# Patient Record
Sex: FEMALE | Race: WHITE | NOT HISPANIC OR LATINO | Employment: OTHER | URBAN - METROPOLITAN AREA
[De-identification: names, ages, dates, MRNs, and addresses within clinical notes are randomized per-mention and may not be internally consistent; named-entity substitution may affect disease eponyms.]

---

## 2023-05-10 ENCOUNTER — TELEPHONE (OUTPATIENT)
Dept: FAMILY MEDICINE | Facility: CLINIC | Age: 23
End: 2023-05-10

## 2023-05-10 NOTE — TELEPHONE ENCOUNTER
LMOR informing of appt for 05/11/2023. Come 15 min prior to fill out paperwork and bring medications if any.

## 2023-06-14 ENCOUNTER — TELEPHONE (OUTPATIENT)
Dept: FAMILY MEDICINE | Facility: CLINIC | Age: 23
End: 2023-06-14

## 2023-06-14 DIAGNOSIS — Z34.90 PREGNANCY, UNSPECIFIED GESTATIONAL AGE: Primary | ICD-10-CM

## 2023-06-14 NOTE — TELEPHONE ENCOUNTER
----- Message from Vee Nixon sent at 6/14/2023  2:09 PM CDT -----  Pt is having some cramping and is concerned about her pregnancy. Can you send a referral to an OBGYN? Please advise. Thank you  ----- Message -----  From: Kristin Perla  Sent: 6/14/2023  11:09 AM CDT  To: Vee Nixon    Patient called and stated that she has a new patient appointment coming up at the end of the month and she would like to see if she can move it up? She just found out she is pregnant yesterday and she need to get in to see an ob/gyn and she need a referral. Please give her a call at [phone]

## 2023-06-15 ENCOUNTER — TELEPHONE (OUTPATIENT)
Dept: FAMILY MEDICINE | Facility: CLINIC | Age: 23
End: 2023-06-15

## 2023-06-15 NOTE — TELEPHONE ENCOUNTER
----- Message from Linh Bah MA sent at 6/15/2023 10:44 AM CDT -----  Regarding: new patient needs referal  Patient called yesterday to check status on GYN referal for severe cramping. 2nd request. She was rescheduled by our office 5/11/23 but dr. Sosa was out that day and patient was rescheduled to 6/28/2023..  [phone]

## 2023-06-19 LAB — RUBELLA IMMUNE STATUS: NORMAL

## 2023-06-20 ENCOUNTER — TELEPHONE (OUTPATIENT)
Dept: FAMILY MEDICINE | Facility: CLINIC | Age: 23
End: 2023-06-20

## 2023-06-20 NOTE — TELEPHONE ENCOUNTER
----- Message from Kristin Perla sent at 6/20/2023  3:35 PM CDT -----  Andrew asif called and stated that she is faxing over a order form for diabetic supplies for the patient and she need to get the patient last office visit information. Please give her a call at [phone]

## 2023-06-30 ENCOUNTER — TELEPHONE (OUTPATIENT)
Dept: FAMILY MEDICINE | Facility: CLINIC | Age: 23
End: 2023-06-30

## 2023-06-30 DIAGNOSIS — E10.69 TYPE 1 DIABETES MELLITUS WITH OTHER SPECIFIED COMPLICATION: Primary | ICD-10-CM

## 2023-06-30 DIAGNOSIS — Z34.90 PREGNANCY, UNSPECIFIED GESTATIONAL AGE: ICD-10-CM

## 2023-06-30 NOTE — TELEPHONE ENCOUNTER
----- Message from Kristin Perla sent at 6/30/2023 11:49 AM CDT -----  FYI : patient called and was checking on to see if her referral was set up her an endocrinologist advised that the nurse Akiko  stated that she will check with the doctor when she is back in the office next week. No patient call back needed for this patient.

## 2023-06-30 NOTE — TELEPHONE ENCOUNTER
Spoke with patient to cancel NP appointment due to pregnancy.  Patient states she still needs visit as she is type 1 diabetic and requires an endocrine referral per gyn.  States gyn cannot give referral as she is .  Referral pended -ANABELA

## 2023-07-07 LAB
HIV 1+2 AB+HIV1 P24 AG SERPL QL IA: NEGATIVE
RPR: NON REACTIVE

## 2023-08-15 ENCOUNTER — TELEPHONE (OUTPATIENT)
Dept: FAMILY MEDICINE | Facility: CLINIC | Age: 23
End: 2023-08-15

## 2023-08-15 NOTE — TELEPHONE ENCOUNTER
Spoke to pt confirming NP appointment. Let pt know they will need to bring in all of medication bottles. Advised that if pt no shows unable to reschuele appt

## 2023-08-16 ENCOUNTER — OFFICE VISIT (OUTPATIENT)
Dept: FAMILY MEDICINE | Facility: CLINIC | Age: 23
End: 2023-08-16
Payer: OTHER GOVERNMENT

## 2023-08-16 VITALS
HEART RATE: 88 BPM | SYSTOLIC BLOOD PRESSURE: 118 MMHG | HEIGHT: 63 IN | DIASTOLIC BLOOD PRESSURE: 72 MMHG | WEIGHT: 145 LBS | BODY MASS INDEX: 25.69 KG/M2

## 2023-08-16 DIAGNOSIS — E10.9 TYPE 1 DIABETES MELLITUS WITHOUT COMPLICATION: ICD-10-CM

## 2023-08-16 DIAGNOSIS — Z33.1 IUP (INTRAUTERINE PREGNANCY), INCIDENTAL: ICD-10-CM

## 2023-08-16 DIAGNOSIS — Z76.89 ESTABLISHING CARE WITH NEW DOCTOR, ENCOUNTER FOR: Primary | ICD-10-CM

## 2023-08-16 PROBLEM — M67.00 ACQUIRED SHORT ACHILLES TENDON: Status: ACTIVE | Noted: 2023-08-16

## 2023-08-16 PROBLEM — E11.9 DIABETES MELLITUS: Status: ACTIVE | Noted: 2023-08-16

## 2023-08-16 PROBLEM — H52.00 HYPEROPIA: Status: ACTIVE | Noted: 2023-08-16

## 2023-08-16 PROBLEM — H71.90 CHOLESTEATOMA: Status: ACTIVE | Noted: 2023-08-16

## 2023-08-16 PROCEDURE — 99385 PREV VISIT NEW AGE 18-39: CPT | Mod: S$GLB,,,

## 2023-08-16 PROCEDURE — 99385 PR PREVENTIVE VISIT,NEW,18-39: ICD-10-PCS | Mod: S$GLB,,,

## 2023-08-16 RX ORDER — INSULIN LISPRO 100 [IU]/ML
INJECTION, SOLUTION INTRAVENOUS; SUBCUTANEOUS
COMMUNITY
End: 2023-09-07 | Stop reason: SDUPTHER

## 2023-08-16 NOTE — PROGRESS NOTES
SUBJECTIVE:    Patient ID: Yana Richards is a 23 y.o. female.    Chief Complaint: Establish Care (New patient to est. Care//no med bottles//patient is currently 14 weeks in gestation//diabetic type 1 since age 14//tc)    Yana presents to clinic today to establish care. She has  medical coverage, which requires her to have a pcp to obtain referrals to specialists. She has a history of IDDM since she was 14 years old. She wears an insulin pump. She will have an appt soon with endo and is hoping to get a pump that works with a CGM to automatically deliver insulin based on her needs. She feels well overall. She manages her diabetes well.   She is currently pregnant. Her EDC is  2/14/2024. She has been having routine prenatal care with Dr. Ann Moore.   She does need supplies for her insulin pump. She uses [employer]. The contact number is [phone].            No visits with results within 6 Month(s) from this visit.   Latest known visit with results is:   No results found for any previous visit.       Past Medical History:   Diagnosis Date    Diabetes mellitus type I      Social History     Socioeconomic History    Marital status: Single   Tobacco Use    Smoking status: Former     Current packs/day: 0.00     Types: Cigarettes    Smokeless tobacco: Never   Substance and Sexual Activity    Alcohol use: Not Currently     Alcohol/week: 1.0 standard drink of alcohol     Types: 1 Glasses of wine per week    Drug use: Never    Sexual activity: Yes     Past Surgical History:   Procedure Laterality Date    main calf muscle removal  08/21/2013    TYMPANOSTOMY TUBE PLACEMENT Bilateral 08/09/2006     Family History   Problem Relation Age of Onset    Miscarriages / Stillbirths Mother     No Known Problems Father        Review of patient's allergies indicates:   Allergen Reactions    Vancomycin analogues Anaphylaxis       Current Outpatient Medications:     insulin lispro 100 unit/mL injection, Inject  "into the skin 3 (three) times daily before meals. Insulin pump humalog, Disp: , Rfl:     Review of Systems   Constitutional:  Negative for activity change, appetite change, chills, fatigue, fever and unexpected weight change.   HENT:  Negative for nasal congestion, ear pain, postnasal drip, rhinorrhea, sore throat and trouble swallowing.    Eyes:  Negative for discharge and visual disturbance.   Respiratory:  Negative for apnea, cough, shortness of breath and wheezing.    Cardiovascular:  Negative for chest pain, palpitations and leg swelling.   Gastrointestinal:  Negative for abdominal pain, blood in stool, constipation, diarrhea, nausea, vomiting and reflux.   Genitourinary:  Negative for bladder incontinence, dysuria, frequency and urgency.   Musculoskeletal:  Negative for arthralgias, gait problem, leg pain and myalgias.   Integumentary:  Negative for rash and mole/lesion.   Neurological:  Negative for dizziness, tremors, weakness, light-headedness, numbness and headaches.   Hematological:  Does not bruise/bleed easily.   Psychiatric/Behavioral:  Negative for dysphoric mood, sleep disturbance and suicidal ideas. The patient is not nervous/anxious.            Objective:      Vitals:    08/16/23 1530   BP: 118/72   Pulse: 88   Weight: 65.8 kg (145 lb)   Height: 5' 2.5" (1.588 m)     Physical Exam  Vitals and nursing note reviewed.   Constitutional:       General: She is not in acute distress.     Appearance: Normal appearance. She is well-developed and normal weight. She is not ill-appearing.   HENT:      Head: Normocephalic and atraumatic.      Right Ear: External ear normal.      Left Ear: External ear normal.      Nose: Nose normal. No congestion or rhinorrhea.      Mouth/Throat:      Mouth: Mucous membranes are moist.      Pharynx: Oropharynx is clear. No oropharyngeal exudate or posterior oropharyngeal erythema.   Eyes:      General:         Right eye: No discharge.         Left eye: No discharge.      " Conjunctiva/sclera: Conjunctivae normal.      Pupils: Pupils are equal, round, and reactive to light.   Neck:      Thyroid: No thyromegaly.   Cardiovascular:      Rate and Rhythm: Normal rate and regular rhythm.      Pulses: Normal pulses.      Heart sounds: Normal heart sounds. No murmur heard.  Pulmonary:      Effort: Pulmonary effort is normal.      Breath sounds: Normal breath sounds. No wheezing or rales.   Abdominal:      General: Bowel sounds are normal. There is no distension.      Palpations: Abdomen is soft.      Tenderness: There is no abdominal tenderness.   Musculoskeletal:         General: No tenderness or deformity. Normal range of motion.      Cervical back: Normal range of motion and neck supple.      Lumbar back: Normal. No spasms.      Right lower leg: No edema.      Left lower leg: No edema.      Comments: Bends 90 degrees at  waist   Skin:     General: Skin is warm and dry.      Capillary Refill: Capillary refill takes less than 2 seconds.      Coloration: Skin is not jaundiced or pale.      Findings: No lesion or rash.   Neurological:      General: No focal deficit present.      Mental Status: She is alert and oriented to person, place, and time.      Cranial Nerves: No cranial nerve deficit.      Motor: No weakness.      Coordination: Coordination normal.      Gait: Gait normal.   Psychiatric:         Mood and Affect: Mood normal.         Behavior: Behavior normal.         Thought Content: Thought content normal.         Judgment: Judgment normal.           Assessment:       1. Establishing care with new doctor, encounter for    2. Type 1 diabetes mellitus without complication    3. IUP (intrauterine pregnancy), incidental         Plan:       Establishing care with new doctor, encounter for  Patient has appt with endocrinology to get established. Has already seen OB.    Type 1 diabetes mellitus without complication  Needs pump supplies. Will contact company.    IUP (intrauterine pregnancy),  incidental  EDC 2/14/2024 male fetus.       Follow up in about 6 months (around 2/16/2024).        8/20/2023 Michelle Doherty

## 2023-09-07 ENCOUNTER — OFFICE VISIT (OUTPATIENT)
Dept: ENDOCRINOLOGY | Facility: CLINIC | Age: 23
End: 2023-09-07
Payer: OTHER GOVERNMENT

## 2023-09-07 VITALS
TEMPERATURE: 98 F | SYSTOLIC BLOOD PRESSURE: 104 MMHG | OXYGEN SATURATION: 99 % | BODY MASS INDEX: 26.62 KG/M2 | DIASTOLIC BLOOD PRESSURE: 70 MMHG | WEIGHT: 150.25 LBS | HEIGHT: 63 IN | HEART RATE: 106 BPM

## 2023-09-07 DIAGNOSIS — O24.012 TYPE 1 DIABETES MELLITUS DURING PREGNANCY IN SECOND TRIMESTER: ICD-10-CM

## 2023-09-07 PROCEDURE — 99204 OFFICE O/P NEW MOD 45 MIN: CPT | Mod: S$PBB,,, | Performed by: PHYSICIAN ASSISTANT

## 2023-09-07 PROCEDURE — 99214 OFFICE O/P EST MOD 30 MIN: CPT | Mod: PBBFAC,PO | Performed by: PHYSICIAN ASSISTANT

## 2023-09-07 PROCEDURE — 99999 PR PBB SHADOW E&M-EST. PATIENT-LVL IV: ICD-10-PCS | Mod: PBBFAC,,, | Performed by: PHYSICIAN ASSISTANT

## 2023-09-07 PROCEDURE — 99204 PR OFFICE/OUTPT VISIT, NEW, LEVL IV, 45-59 MIN: ICD-10-PCS | Mod: S$PBB,,, | Performed by: PHYSICIAN ASSISTANT

## 2023-09-07 PROCEDURE — 99999 PR PBB SHADOW E&M-EST. PATIENT-LVL IV: CPT | Mod: PBBFAC,,, | Performed by: PHYSICIAN ASSISTANT

## 2023-09-07 RX ORDER — BLOOD SUGAR DIAGNOSTIC
STRIP MISCELLANEOUS
Qty: 400 STRIP | Refills: 3 | Status: SHIPPED | OUTPATIENT
Start: 2023-09-07

## 2023-09-07 RX ORDER — INSULIN GLARGINE 100 [IU]/ML
INJECTION, SOLUTION SUBCUTANEOUS
Qty: 15 ML | Refills: 1 | Status: SHIPPED | OUTPATIENT
Start: 2023-09-07

## 2023-09-07 RX ORDER — INSULIN LISPRO 100 [IU]/ML
INJECTION, SOLUTION INTRAVENOUS; SUBCUTANEOUS
Qty: 30 ML | Refills: 11 | Status: SHIPPED | OUTPATIENT
Start: 2023-09-07

## 2023-09-07 RX ORDER — GLUCAGON 3 MG/1
POWDER NASAL
Qty: 2 EACH | Refills: 1 | Status: SHIPPED | OUTPATIENT
Start: 2023-09-07

## 2023-09-07 RX ORDER — BLOOD-GLUCOSE SENSOR
EACH MISCELLANEOUS
Qty: 3 EACH | Refills: 11 | Status: SHIPPED | OUTPATIENT
Start: 2023-09-07

## 2023-09-07 NOTE — PROGRESS NOTES
CC: This 23 y.o. female presents for management of T1DM  and chronic conditions pending review including HTN, HLP    HPI: was diagnosed with T1DM when she was 13 yo. New to endocrine. Arrives w/ her bf. She moved from [place] in 1/23. She was admitted for DKA twice. She is 18 weeks pregnant.     Family hx of DM: none  Fhx of thyroid disease: none  Denies missing doses of DM medication.       Diet:   BF-brown rice w/ egg  LH-skips  DN-steak potatoes, salad  Snacks on cheese. Avoids sugary beverages.     Exercise: walks with her dog ~2x weekly    Standards of Care:  Eye exam: 12/22   Podiatry exam: 1 year ago  DE: At dx    Using Humalog.  Name of Device or Devices used by the patient: [employer] 630 G  When did you start the current therapy you are on: 2016  Frequency of changing site/infusion set/tubing/cartridges:   Frequency of changing CGM: n/a  Using bolus wizard:   Taking bolus with each meal:     Statistics  Low / High SG Alerts (per day) 0.0 / 0.0 --  Coefficient of Variation (%) -- --  GMI -- --  Average SG ± SD -- --  Sensor Wear (per week) 0% (00h) --  BG / Calibration (per day) 2.6 / 0.0 --  Average  ± 49 mg/dL --  [place] Change Every 6.0 days --  Set Change Every 6.3 days --  Basal amount (per day) 15.7U (34%) --  Bolus amount (per day) 30.8U (66%) --  Total daily dose (per day) 46.5 units --  Carbs entered (per day) 148 ± 60 g --  Meal (per day) 4.1 --  Active Insulin time 2:00 hrs --      Settings:  Basal  MN:  0.600 u/hr  7 am: 0.550 u/hr  10 am: 0.700 u/hr  1 pm: 0.750 u/hr    ChO  MN: 7  7 am: 7  11 am: 7  14:30: 6.0    ISF: 40  Target:     PMHx, PSHx: reviewed in epic.  Social Hx: no E/T use.    Wt Readings from Last 10 Encounters:   09/07/23 68.2 kg (150 lb 3.9 oz)   08/16/23 65.8 kg (145 lb)      ROS:   Gen: Appetite good, no weight gain or loss, denies fatigue and weakness.  Skin: Skin is intact and heals well, no rashes, no hair changes  Eyes: Denies visual disturbances  Resp: no  "SOB or CASAS, no cough  Cardiac: No palpitations, chest pain, no edema   GI: No nausea or vomiting, diarrhea, constipation, or abdominal pain.  /GYN: No nocturia, burning or pain.   MS/Neuro: Denies numbness/ tingling in BLE; Gait steady, speech clear  Psych: Denies drug/ETOH abuse, no hx of depression.  Other systems: negative.    /70 (BP Location: Left arm, Patient Position: Sitting, BP Method: Small (Manual))   Pulse 106   Temp 98.3 °F (36.8 °C) (Oral)   Ht 5' 2.5" (1.588 m)   Wt 68.2 kg (150 lb 3.9 oz)   SpO2 99%   BMI 27.04 kg/m²      PE:  GENERAL: Well developed, well nourished.  PSYCH: AAOx3, appropriate mood and affect, pleasant expression, conversant, appears relaxed, well groomed.   EYES: Conjunctiva, corneas clear  NECK: Supple, trachea midline,no thyromegaly or nodules  CHEST: Resp even and unlabored, CTA bilateral.  CARDIAC: RRR, S1, S2 heard, no murmurs  VASCULAR: DP pulses +2/4 bilaterally, no edema.  NEURO: Gait steady  SKIN: Skin warm and dry no acanthosis nigracans.  8/23  Foot Exam: no sores or macerations noted.     Protective Sensation (w/ 10 gram monofilament):  Right: Intact  Left: Intact    Visual Inspection:  Normal -  Bilateral and Nails Intact - without Evidence of Foot Deformity- Bilateral    Pedal Pulses:   Right: Present  Left: Present    Posterior Tibialis Pulses:   Right:Present  Left: Present     Vibratory Sensation  Right:Positive  Left:Positive     Personally reviewed labs below:    No visits with results within 1 Month(s) from this visit.   Latest known visit with results is:   No results found for any previous visit.         ASSESSMENT and PLAN:    1. Type 1 diabetes mellitus during pregnancy in second trimester  Ambulatory referral/consult to Endocrinology    Hemoglobin A1C    Comprehensive Metabolic Panel    CBC Auto Differential    Lipid Panel    T4, Free    TSH    glucagon (BAQSIMI) 3 mg/actuation Spry    CONTOUR NEXT TEST STRIPS Strp    insulin (LANTUS SOLOSTAR " U-100 INSULIN) glargine 100 units/mL SubQ pen    DEXCOM G7 SENSOR Karly           T1DM in second trimester 18 weeks pregnant  She is having hyperglycemia throughout the day.   Change tubing ~3 days.       Goal a1c ~6.0%.       Simplify basal settings.          Change settings below:              Basal             MN: 0.75 u/hr             7 am: 0.7 u/hr             1 pm: 0.9 u/hr                         ISF: 30   Continue all other settings.  Given G7 samples. Upload log in two weeks.  Referral to DE to make adjustments.   A1c monthly.    Pump Malfunction instructions:  Lantus 25 u qhs  Humalog CHO 7     Sent baqsimi.    Follow-Up   Obtain lab from Dr. Moore office  Labs in one month  F/u in 3 mths

## 2023-09-14 ENCOUNTER — TELEPHONE (OUTPATIENT)
Dept: FAMILY MEDICINE | Facility: CLINIC | Age: 23
End: 2023-09-14

## 2023-09-14 NOTE — TELEPHONE ENCOUNTER
Pt states with the switch from MAREK calderon to Dr. Eduardo hair is denying coverage for diabetic supplies. Pt states Ralf informed her it has something to do with the referral. States we need to contact ralf to see how to get this fixed.

## 2023-09-14 NOTE — TELEPHONE ENCOUNTER
----- Message from Rose Fletcher sent at 9/14/2023 11:28 AM CDT -----  Pt needs a call about her insurance denying her diabetic supplies. [phone]  Audrey Roberson

## 2023-09-15 NOTE — TELEPHONE ENCOUNTER
Informed pt due to her not being under out care at the time of the orders , laxmi will not cover it. Pt voiced understanding.

## 2023-09-27 ENCOUNTER — TELEPHONE (OUTPATIENT)
Dept: FAMILY MEDICINE | Facility: CLINIC | Age: 23
End: 2023-09-27

## 2023-09-27 NOTE — TELEPHONE ENCOUNTER
PA sent to Cover My Meds   Dexcom 7 is not a covered device  Denied   Please advise        ----- Message from Satnam Longoria sent at 9/27/2023  1:14 PM CDT -----  Type: Needs Medical Advice  Who Called:  Patient    Pharmacy name and phone #:    [id number] - [place] - [address] [facility] [address]  [facility] [address]  Phone: [phone] Fax: [phone]      Best Call Back Number: [phone]  Additional Information: Patient states that her Dexcom isn't covered as written:  DEXCOM G7 SENSOR Karly    Please call to advise

## 2023-09-27 NOTE — TELEPHONE ENCOUNTER
Patient notified That Bessie needs her to verify her current providers.   And Bessie will fax paperwork to fill Dexcom 7

## 2023-10-05 ENCOUNTER — HOSPITAL ENCOUNTER (EMERGENCY)
Facility: HOSPITAL | Age: 23
Discharge: HOME | End: 2023-10-05
Attending: EMERGENCY MEDICINE
Payer: OTHER GOVERNMENT

## 2023-10-05 VITALS
BODY MASS INDEX: 29.26 KG/M2 | WEIGHT: 159 LBS | RESPIRATION RATE: 16 BRPM | HEART RATE: 103 BPM | DIASTOLIC BLOOD PRESSURE: 73 MMHG | SYSTOLIC BLOOD PRESSURE: 127 MMHG | TEMPERATURE: 99 F | HEIGHT: 62 IN | OXYGEN SATURATION: 97 %

## 2023-10-05 DIAGNOSIS — R11.2 NAUSEA AND VOMITING, UNSPECIFIED VOMITING TYPE: ICD-10-CM

## 2023-10-05 DIAGNOSIS — R51.9 NONINTRACTABLE HEADACHE, UNSPECIFIED CHRONICITY PATTERN, UNSPECIFIED HEADACHE TYPE: Primary | ICD-10-CM

## 2023-10-05 LAB
ALBUMIN SERPL BCP-MCNC: 3.7 G/DL (ref 3.5–5.2)
ALP SERPL-CCNC: 57 U/L (ref 55–135)
ALT SERPL W/O P-5'-P-CCNC: 15 U/L (ref 10–44)
ANION GAP SERPL CALC-SCNC: 9 MMOL/L (ref 8–16)
AST SERPL-CCNC: 12 U/L (ref 10–40)
BACTERIA #/AREA URNS HPF: ABNORMAL /HPF
BASOPHILS # BLD AUTO: 0.04 K/UL (ref 0–0.2)
BASOPHILS NFR BLD: 0.4 % (ref 0–1.9)
BILIRUB SERPL-MCNC: 0.3 MG/DL (ref 0.1–1)
BILIRUB UR QL STRIP: NEGATIVE
BUN SERPL-MCNC: 10 MG/DL (ref 6–20)
CALCIUM SERPL-MCNC: 8.8 MG/DL (ref 8.7–10.5)
CHLORIDE SERPL-SCNC: 102 MMOL/L (ref 95–110)
CLARITY UR: ABNORMAL
CO2 SERPL-SCNC: 24 MMOL/L (ref 23–29)
COLOR UR: YELLOW
CREAT SERPL-MCNC: 0.5 MG/DL (ref 0.5–1.4)
DIFFERENTIAL METHOD: ABNORMAL
EOSINOPHIL # BLD AUTO: 0.1 K/UL (ref 0–0.5)
EOSINOPHIL NFR BLD: 0.5 % (ref 0–8)
ERYTHROCYTE [DISTWIDTH] IN BLOOD BY AUTOMATED COUNT: 12.6 % (ref 11.5–14.5)
EST. GFR  (NO RACE VARIABLE): >60 ML/MIN/1.73 M^2
GLUCOSE SERPL-MCNC: 149 MG/DL (ref 70–110)
GLUCOSE SERPL-MCNC: 152 MG/DL (ref 70–110)
GLUCOSE UR QL STRIP: ABNORMAL
HCT VFR BLD AUTO: 36.7 % (ref 37–48.5)
HGB BLD-MCNC: 12.3 G/DL (ref 12–16)
HGB UR QL STRIP: NEGATIVE
HYALINE CASTS #/AREA URNS LPF: 6 /LPF
IMM GRANULOCYTES # BLD AUTO: 0.07 K/UL (ref 0–0.04)
IMM GRANULOCYTES NFR BLD AUTO: 0.6 % (ref 0–0.5)
KETONES UR QL STRIP: 100
LEUKOCYTE ESTERASE UR QL STRIP: ABNORMAL
LYMPHOCYTES # BLD AUTO: 1.6 K/UL (ref 1–4.8)
LYMPHOCYTES NFR BLD: 14.4 % (ref 18–48)
MCH RBC QN AUTO: 28.9 PG (ref 27–31)
MCHC RBC AUTO-ENTMCNC: 33.5 G/DL (ref 32–36)
MCV RBC AUTO: 86 FL (ref 82–98)
MICROSCOPIC COMMENT: ABNORMAL
MONOCYTES # BLD AUTO: 0.6 K/UL (ref 0.3–1)
MONOCYTES NFR BLD: 5.3 % (ref 4–15)
NEUTROPHILS # BLD AUTO: 8.7 K/UL (ref 1.8–7.7)
NEUTROPHILS NFR BLD: 78.8 % (ref 38–73)
NITRITE UR QL STRIP: NEGATIVE
NRBC BLD-RTO: 0 /100 WBC
PH UR STRIP: 7 [PH] (ref 5–8)
PLATELET # BLD AUTO: 303 K/UL (ref 150–450)
PMV BLD AUTO: 11 FL (ref 9.2–12.9)
POTASSIUM SERPL-SCNC: 3.7 MMOL/L (ref 3.5–5.1)
PROT SERPL-MCNC: 6.6 G/DL (ref 6–8.4)
PROT UR QL STRIP: ABNORMAL
RBC # BLD AUTO: 4.25 M/UL (ref 4–5.4)
RBC #/AREA URNS HPF: 1 /HPF (ref 0–4)
SARS-COV-2 RDRP RESP QL NAA+PROBE: NEGATIVE
SODIUM SERPL-SCNC: 135 MMOL/L (ref 136–145)
SP GR UR STRIP: 1.01 (ref 1–1.03)
SQUAMOUS #/AREA URNS HPF: 9 /HPF
URN SPEC COLLECT METH UR: ABNORMAL
UROBILINOGEN UR STRIP-ACNC: NEGATIVE EU/DL
WBC # BLD AUTO: 11.03 K/UL (ref 3.9–12.7)
WBC #/AREA URNS HPF: 4 /HPF (ref 0–5)
YEAST URNS QL MICRO: ABNORMAL

## 2023-10-05 PROCEDURE — 96375 TX/PRO/DX INJ NEW DRUG ADDON: CPT

## 2023-10-05 PROCEDURE — 85025 COMPLETE CBC W/AUTO DIFF WBC: CPT | Performed by: STUDENT IN AN ORGANIZED HEALTH CARE EDUCATION/TRAINING PROGRAM

## 2023-10-05 PROCEDURE — 25000003 PHARM REV CODE 250: Performed by: STUDENT IN AN ORGANIZED HEALTH CARE EDUCATION/TRAINING PROGRAM

## 2023-10-05 PROCEDURE — 96374 THER/PROPH/DIAG INJ IV PUSH: CPT

## 2023-10-05 PROCEDURE — 63600175 PHARM REV CODE 636 W HCPCS: Performed by: EMERGENCY MEDICINE

## 2023-10-05 PROCEDURE — 96361 HYDRATE IV INFUSION ADD-ON: CPT

## 2023-10-05 PROCEDURE — U0002 COVID-19 LAB TEST NON-CDC: HCPCS | Performed by: EMERGENCY MEDICINE

## 2023-10-05 PROCEDURE — 81001 URINALYSIS AUTO W/SCOPE: CPT | Performed by: EMERGENCY MEDICINE

## 2023-10-05 PROCEDURE — 25000003 PHARM REV CODE 250: Performed by: EMERGENCY MEDICINE

## 2023-10-05 PROCEDURE — 99284 EMERGENCY DEPT VISIT MOD MDM: CPT | Mod: 25

## 2023-10-05 PROCEDURE — 80053 COMPREHEN METABOLIC PANEL: CPT | Performed by: STUDENT IN AN ORGANIZED HEALTH CARE EDUCATION/TRAINING PROGRAM

## 2023-10-05 PROCEDURE — 82962 GLUCOSE BLOOD TEST: CPT

## 2023-10-05 RX ORDER — DIPHENHYDRAMINE HYDROCHLORIDE 50 MG/ML
25 INJECTION INTRAMUSCULAR; INTRAVENOUS
Status: COMPLETED | OUTPATIENT
Start: 2023-10-05 | End: 2023-10-05

## 2023-10-05 RX ORDER — PROCHLORPERAZINE EDISYLATE 5 MG/ML
10 INJECTION INTRAMUSCULAR; INTRAVENOUS
Status: COMPLETED | OUTPATIENT
Start: 2023-10-05 | End: 2023-10-05

## 2023-10-05 RX ORDER — ONDANSETRON 4 MG/1
4 TABLET, ORALLY DISINTEGRATING ORAL EVERY 6 HOURS PRN
Qty: 12 TABLET | Refills: 0 | Status: SHIPPED | OUTPATIENT
Start: 2023-10-05

## 2023-10-05 RX ORDER — ACETAMINOPHEN 500 MG
1000 TABLET ORAL
Status: COMPLETED | OUTPATIENT
Start: 2023-10-05 | End: 2023-10-05

## 2023-10-05 RX ORDER — METOCLOPRAMIDE HYDROCHLORIDE 5 MG/ML
10 INJECTION INTRAMUSCULAR; INTRAVENOUS
Status: DISCONTINUED | OUTPATIENT
Start: 2023-10-05 | End: 2023-10-05

## 2023-10-05 RX ORDER — ONDANSETRON 4 MG/1
4 TABLET, ORALLY DISINTEGRATING ORAL EVERY 6 HOURS PRN
Qty: 12 TABLET | Refills: 0 | Status: SHIPPED | OUTPATIENT
Start: 2023-10-05 | End: 2023-10-05 | Stop reason: SDUPTHER

## 2023-10-05 RX ADMIN — SODIUM CHLORIDE 1000 ML: 0.9 INJECTION, SOLUTION INTRAVENOUS at 05:10

## 2023-10-05 RX ADMIN — ACETAMINOPHEN 1000 MG: 500 TABLET ORAL at 04:10

## 2023-10-05 RX ADMIN — PROCHLORPERAZINE EDISYLATE 10 MG: 5 INJECTION INTRAMUSCULAR; INTRAVENOUS at 05:10

## 2023-10-05 RX ADMIN — DIPHENHYDRAMINE HYDROCHLORIDE 25 MG: 50 INJECTION INTRAMUSCULAR; INTRAVENOUS at 05:10

## 2023-10-05 NOTE — FIRST PROVIDER EVALUATION
Medical screening examination initiated.  I have conducted a focused provider triage encounter, findings are as follows:    Brief history of present illness:  22wk pregnant with acute migraine     There were no vitals filed for this visit.    Pertinent physical exam:  nontoxic, grossly neuro intact. No htn,     Brief workup plan: cbc, cmp, tylenol    Preliminary workup initiated; this workup will be continued and followed by the physician or advanced practice provider that is assigned to the patient when roomed.

## 2023-10-06 NOTE — DISCHARGE INSTRUCTIONS
Return to the ER for worsened or persistent headache, uncontrolled vomiting, or for any other concerns.  Follow-up with your OB for re-evaluation.

## 2023-10-06 NOTE — ED PROVIDER NOTES
Encounter Date: 10/5/2023       History     Chief Complaint   Patient presents with    Headache    Nausea    Vomiting     Pt type one diabetic with continuous monitoring.  Pt states normal BG today.  Pt woke up with N&V today along with a headache     This is a 23-year-old female presenting with headache, nausea, vomiting.  She is 22 weeks pregnant.  She developed headache this morning.  The headache is generalized, severe, and she is vomited multiple times today.  She denies fever or neck stiffness.  She denies abdominal pain or cramping or vaginal bleeding.  She denies history of headaches.  She denies history of nausea with this pregnancy.  She was diabetic and says her glucose has been controlled.    The history is provided by the patient.     Review of patient's allergies indicates:   Allergen Reactions    Vancomycin analogues Anaphylaxis     Past Medical History:   Diagnosis Date    Diabetes mellitus type I      Past Surgical History:   Procedure Laterality Date    main calf muscle removal  08/21/2013    TYMPANOSTOMY TUBE PLACEMENT Bilateral 08/09/2006     Family History   Problem Relation Age of Onset    Miscarriages / Stillbirths Mother     No Known Problems Father      Social History     Tobacco Use    Smoking status: Former     Types: Cigarettes    Smokeless tobacco: Never   Substance Use Topics    Alcohol use: Not Currently     Alcohol/week: 1.0 standard drink of alcohol     Types: 1 Glasses of wine per week    Drug use: Never     Review of Systems   Gastrointestinal:  Positive for nausea and vomiting.   Neurological:  Positive for headaches.   All other systems reviewed and are negative.      Physical Exam     Initial Vitals [10/05/23 1521]   BP Pulse Resp Temp SpO2   121/75 106 20 98.5 °F (36.9 °C) 97 %      MAP       --         Physical Exam    Nursing note and vitals reviewed.  Constitutional: She appears well-developed and well-nourished. She is not diaphoretic. No distress.   HENT:   Head:  Normocephalic.   Eyes: Conjunctivae are normal.   Neck: Neck supple.   No nuchal rigidity   Normal range of motion.  Cardiovascular:  Normal rate.           Pulmonary/Chest: No respiratory distress.   Abdominal: She exhibits no distension. There is no abdominal tenderness.   Musculoskeletal:         General: No edema.      Cervical back: Normal range of motion and neck supple.     Neurological: She is alert. She has normal strength. GCS eye subscore is 4. GCS verbal subscore is 5. GCS motor subscore is 6.   Skin: Skin is warm and dry.   Psychiatric: She has a normal mood and affect.         ED Course   Procedures  Labs Reviewed   CBC W/ AUTO DIFFERENTIAL - Abnormal; Notable for the following components:       Result Value    Hematocrit 36.7 (*)     Immature Granulocytes 0.6 (*)     Gran # (ANC) 8.7 (*)     Immature Grans (Abs) 0.07 (*)     Gran % 78.8 (*)     Lymph % 14.4 (*)     All other components within normal limits   COMPREHENSIVE METABOLIC PANEL - Abnormal; Notable for the following components:    Sodium 135 (*)     Glucose 152 (*)     All other components within normal limits   URINALYSIS, REFLEX TO URINE CULTURE - Abnormal; Notable for the following components:    Appearance, UA Hazy (*)     Protein, UA Trace (*)     Glucose, UA 4+ (*)     Leukocytes, UA Trace (*)     All other components within normal limits    Narrative:     Specimen Source->Urine   URINALYSIS MICROSCOPIC - Abnormal; Notable for the following components:    Hyaline Casts, UA 6 (*)     All other components within normal limits    Narrative:     Specimen Source->Urine   POCT GLUCOSE - Abnormal; Notable for the following components:    POC Glucose 149 (*)     All other components within normal limits   SARS-COV-2 RNA AMPLIFICATION, QUAL   POCT GLUCOSE MONITORING CONTINUOUS          Imaging Results    None          Medications   acetaminophen tablet 1,000 mg (1,000 mg Oral Given 10/5/23 1803)   prochlorperazine injection Soln 10 mg (10 mg  Intravenous Given 10/5/23 1726)   diphenhydrAMINE injection 25 mg (25 mg Intravenous Given 10/5/23 1729)   sodium chloride 0.9% bolus 1,000 mL 1,000 mL (0 mLs Intravenous Stopped 10/5/23 1832)     Medical Decision Making  Patient was nontoxic in appearance.  No meningeal signs.  No neurologic deficits.  Blood work is unremarkable.  Patient was treated with IV Compazine, Benadryl, IV fluid bolus.  Upon re-evaluation her nausea has resolved.  Her headache is persistent but she does feel better.  I advised obtaining an MRI to rule out intracranial hemorrhage or mass or other acute process.  Patient refused due to concerns over cost and not wanting to remove her glucose monitor and pump.  I explained and she understands risk of missing potential life-threatening intracranial process.  We will discharge home with antiemetic and encouraged the patient to return if her symptoms worsened.    Risk  Prescription drug management.                               Clinical Impression:   Final diagnoses:  [R51.9] Nonintractable headache, unspecified chronicity pattern, unspecified headache type (Primary)  [R11.2] Nausea and vomiting, unspecified vomiting type        ED Disposition Condition    Discharge Stable          ED Prescriptions       Medication Sig Dispense Start Date End Date Auth. Provider    ondansetron (ZOFRAN-ODT) 4 MG TbDL  (Status: Discontinued) Take 1 tablet (4 mg total) by mouth every 6 (six) hours as needed (nausea). 12 tablet 10/5/2023 10/5/2023 Kimo Connolly MD    ondansetron (ZOFRAN-ODT) 4 MG TbDL Take 1 tablet (4 mg total) by mouth every 6 (six) hours as needed (nausea). 12 tablet 10/5/2023 -- Kimo Connolly MD          Follow-up Information       Follow up With Specialties Details Why Contact Info Additional Information    Michelle Doherty, LUIS-CNP Family Medicine   [address]  [address]  [place] [address]  [phone]       [facility] - Emergency Dept Emergency Medicine  As needed,  If symptoms worsen [address] [facility] [address]  [phone] 1st floor             BirminghamKimo simmons MD  10/05/23 0059

## 2023-10-18 ENCOUNTER — TELEPHONE (OUTPATIENT)
Dept: FAMILY MEDICINE | Facility: CLINIC | Age: 23
End: 2023-10-18

## 2023-10-18 NOTE — TELEPHONE ENCOUNTER
----- Message from Vee Nixon sent at 10/18/2023 12:08 PM CDT -----  Pt needs a new referral for [facility] from Michelle Doherty. The last referral was from Dr. Sosa and expires in 4 days. They also need confirmation from the office. [facility] #[phone], Fax #[phone]. Pt #[phone]

## 2023-10-19 ENCOUNTER — TELEPHONE (OUTPATIENT)
Dept: ENDOCRINOLOGY | Facility: CLINIC | Age: 23
End: 2023-10-19
Payer: OTHER GOVERNMENT

## 2023-10-19 NOTE — TELEPHONE ENCOUNTER
Called and spoke with patient to let her know we have samples of Dexcom G7 until the pharmacy ships hers.    ----- Message from NIDHI Denise PA-C sent at 10/18/2023  5:39 PM CDT -----  Regarding: RE: Dexcom G7  That is fine.  ----- Message -----  From: Isabel Godinez LPN  Sent: 10/18/2023   1:59 PM CDT  To: NIDHI Denise PA-C  Subject: Dexcom G7                                        Spoke with pt and her Dexcom G7 supplies that we gave her  in 24 hours.  [facility] will not send out for a few more weeks.  Pt is asking if she could come get a few more samples to carry her over.  And pt is going on trip to [place] and does not want to be without them.  ----- Message -----  From: Kristina Archer  Sent: 10/18/2023  12:55 PM CDT  To: Howie Moody Staff    Name of Who is Calling:Patient           What is the request in detail:requesting dexcom supplies for the next month or so to hold her over during vacation while she waits for the company to ship her current supplies. Patient also states that the current supplies she has at home expires the next day.           Can the clinic reply by [facility]:no           What Number to Call Back if not in [facility]: [phone]

## 2023-11-07 ENCOUNTER — TELEPHONE (OUTPATIENT)
Dept: ENDOCRINOLOGY | Facility: CLINIC | Age: 23
End: 2023-11-07
Payer: OTHER GOVERNMENT

## 2023-11-07 NOTE — TELEPHONE ENCOUNTER
----- Message from Monica Olivas sent at 11/7/2023  8:40 AM CST -----  Contact: pt  Type:  Needs Medical Advice    Who Called: the patient  Symptoms (please be specific):  How long has patient had these symptoms:    Pharmacy name and phone #:    Would the patient rather a call back or a response via [facility]? call back/[facility]  Best Call Back Number: [phone]   Additional Information: Pt states she needs a DEXCOM G-7 from DME but order was not rec'd   Pt states she needs to  DEXCOM G-7 from office the sensor expires in 2 days   Please advise   Thanks

## 2023-11-07 NOTE — TELEPHONE ENCOUNTER
Spoke with , she said her Dexcom G7 sensor expires in 3 days, do we have a month's worth of sample to give her until [facility] reaches out to us to collect what is needed to complete oder.

## 2023-11-22 ENCOUNTER — TELEPHONE (OUTPATIENT)
Dept: FAMILY MEDICINE | Facility: CLINIC | Age: 23
End: 2023-11-22

## 2023-11-22 NOTE — TELEPHONE ENCOUNTER
----- Message from Vielka Olivas sent at 11/22/2023 11:52 AM CST -----  The patient has been getting billed  from [facility]. She has  [employer] and they have no referral on file for the DME she has been receiving. Pt's # [phone] GH

## 2023-11-28 ENCOUNTER — TELEPHONE (OUTPATIENT)
Dept: ENDOCRINOLOGY | Facility: CLINIC | Age: 23
End: 2023-11-28
Payer: OTHER GOVERNMENT

## 2023-11-28 NOTE — TELEPHONE ENCOUNTER
Called and spoke with Rachelle from [facility] regarding diabetic supplies.  Rachelle stated it needs a PA from [facility].  Called [facility] and rep gave me the fax# to send the clinical questions to.  Faxed answers to [facility] [phone].

## 2023-12-07 ENCOUNTER — TELEPHONE (OUTPATIENT)
Dept: FAMILY MEDICINE | Facility: CLINIC | Age: 23
End: 2023-12-07
Payer: OTHER GOVERNMENT

## 2023-12-07 NOTE — TELEPHONE ENCOUNTER
Spoke with Dr Stephens   Notified   Ms Denise is out of the office until Monday.   Offered her to speak with another provider.   She will wait for Ms Denise on Monday    ----- Message from Jose Ramos sent at 12/7/2023  3:56 PM CST -----  Regarding: Dr brittany Portillo  Contact: Dr Stephens office  Type:  Needs Medical Advice    Who Called: Jessa raman/ [facility]  Dr. Stephens        Would the patient rather a call back or a response via [facility]? Call back    Best Call Back Number: [phone] cell for Dr stephens direct    Additional Information: Sts that Dr Stephens needs to speak to Dr Denise about the pt.   Please advise -- Thank you

## 2023-12-11 ENCOUNTER — HOSPITAL ENCOUNTER (INPATIENT)
Facility: HOSPITAL | Age: 23
LOS: 1 days | Discharge: HOME | DRG: 833 | End: 2023-12-15
Attending: OBSTETRICS & GYNECOLOGY | Admitting: OBSTETRICS & GYNECOLOGY
Payer: OTHER GOVERNMENT

## 2023-12-11 DIAGNOSIS — E11.9 DIABETES MELLITUS: Primary | ICD-10-CM

## 2023-12-11 DIAGNOSIS — Z34.80 SUPERVISION OF NORMAL IUP (INTRAUTERINE PREGNANCY) IN MULTIGRAVIDA, UNSPECIFIED TRIMESTER: ICD-10-CM

## 2023-12-11 LAB
ALBUMIN SERPL BCP-MCNC: 3.2 G/DL (ref 3.5–5.2)
ALP SERPL-CCNC: 87 U/L (ref 55–135)
ALT SERPL W/O P-5'-P-CCNC: 12 U/L (ref 10–44)
ANION GAP SERPL CALC-SCNC: 9 MMOL/L (ref 8–16)
AST SERPL-CCNC: 15 U/L (ref 10–40)
BACTERIA #/AREA URNS HPF: ABNORMAL /HPF
BASOPHILS # BLD AUTO: 0.03 K/UL (ref 0–0.2)
BASOPHILS NFR BLD: 0.3 % (ref 0–1.9)
BILIRUB SERPL-MCNC: 0.2 MG/DL (ref 0.1–1)
BILIRUB UR QL STRIP: NEGATIVE
BUN SERPL-MCNC: 12 MG/DL (ref 6–20)
CALCIUM SERPL-MCNC: 8.8 MG/DL (ref 8.7–10.5)
CHLORIDE SERPL-SCNC: 104 MMOL/L (ref 95–110)
CLARITY UR: ABNORMAL
CO2 SERPL-SCNC: 23 MMOL/L (ref 23–29)
COLOR UR: YELLOW
CREAT SERPL-MCNC: 0.6 MG/DL (ref 0.5–1.4)
DIFFERENTIAL METHOD: ABNORMAL
EOSINOPHIL # BLD AUTO: 0.1 K/UL (ref 0–0.5)
EOSINOPHIL NFR BLD: 0.6 % (ref 0–8)
ERYTHROCYTE [DISTWIDTH] IN BLOOD BY AUTOMATED COUNT: 12.4 % (ref 11.5–14.5)
EST. GFR  (NO RACE VARIABLE): >60 ML/MIN/1.73 M^2
GLUCOSE SERPL-MCNC: 199 MG/DL (ref 70–110)
GLUCOSE SERPL-MCNC: 207 MG/DL (ref 70–110)
GLUCOSE UR QL STRIP: ABNORMAL
HCT VFR BLD AUTO: 32.3 % (ref 37–48.5)
HGB BLD-MCNC: 10.4 G/DL (ref 12–16)
HGB UR QL STRIP: NEGATIVE
HYALINE CASTS #/AREA URNS LPF: 103 /LPF
IMM GRANULOCYTES # BLD AUTO: 0.07 K/UL (ref 0–0.04)
IMM GRANULOCYTES NFR BLD AUTO: 0.7 % (ref 0–0.5)
KETONES UR QL STRIP: ABNORMAL
LEUKOCYTE ESTERASE UR QL STRIP: ABNORMAL
LYMPHOCYTES # BLD AUTO: 1.8 K/UL (ref 1–4.8)
LYMPHOCYTES NFR BLD: 17.3 % (ref 18–48)
MCH RBC QN AUTO: 26.3 PG (ref 27–31)
MCHC RBC AUTO-ENTMCNC: 32.2 G/DL (ref 32–36)
MCV RBC AUTO: 82 FL (ref 82–98)
MICROSCOPIC COMMENT: ABNORMAL
MONOCYTES # BLD AUTO: 0.9 K/UL (ref 0.3–1)
MONOCYTES NFR BLD: 8.1 % (ref 4–15)
NEUTROPHILS # BLD AUTO: 7.7 K/UL (ref 1.8–7.7)
NEUTROPHILS NFR BLD: 73 % (ref 38–73)
NITRITE UR QL STRIP: NEGATIVE
NRBC BLD-RTO: 0 /100 WBC
PH UR STRIP: 6 [PH] (ref 5–8)
PLATELET # BLD AUTO: 305 K/UL (ref 150–450)
PMV BLD AUTO: 12.1 FL (ref 9.2–12.9)
POTASSIUM SERPL-SCNC: 3.8 MMOL/L (ref 3.5–5.1)
PROT SERPL-MCNC: 6 G/DL (ref 6–8.4)
PROT UR QL STRIP: ABNORMAL
RBC # BLD AUTO: 3.96 M/UL (ref 4–5.4)
RBC #/AREA URNS HPF: 1 /HPF (ref 0–4)
SODIUM SERPL-SCNC: 136 MMOL/L (ref 136–145)
SP GR UR STRIP: >1.03 (ref 1–1.03)
SQUAMOUS #/AREA URNS HPF: 10 /HPF
URN SPEC COLLECT METH UR: ABNORMAL
UROBILINOGEN UR STRIP-ACNC: ABNORMAL EU/DL
WBC # BLD AUTO: 10.57 K/UL (ref 3.9–12.7)
WBC #/AREA URNS HPF: 98 /HPF (ref 0–5)
YEAST URNS QL MICRO: ABNORMAL

## 2023-12-11 PROCEDURE — G0378 HOSPITAL OBSERVATION PER HR: HCPCS

## 2023-12-11 PROCEDURE — G0379 DIRECT REFER HOSPITAL OBSERV: HCPCS

## 2023-12-11 PROCEDURE — 80053 COMPREHEN METABOLIC PANEL: CPT | Performed by: OBSTETRICS & GYNECOLOGY

## 2023-12-11 PROCEDURE — 81001 URINALYSIS AUTO W/SCOPE: CPT | Performed by: OBSTETRICS & GYNECOLOGY

## 2023-12-11 PROCEDURE — 87086 URINE CULTURE/COLONY COUNT: CPT | Performed by: OBSTETRICS & GYNECOLOGY

## 2023-12-11 PROCEDURE — 96372 THER/PROPH/DIAG INJ SC/IM: CPT | Performed by: STUDENT IN AN ORGANIZED HEALTH CARE EDUCATION/TRAINING PROGRAM

## 2023-12-11 PROCEDURE — 25000003 PHARM REV CODE 250: Performed by: STUDENT IN AN ORGANIZED HEALTH CARE EDUCATION/TRAINING PROGRAM

## 2023-12-11 PROCEDURE — 63600175 PHARM REV CODE 636 W HCPCS: Performed by: STUDENT IN AN ORGANIZED HEALTH CARE EDUCATION/TRAINING PROGRAM

## 2023-12-11 PROCEDURE — 83036 HEMOGLOBIN GLYCOSYLATED A1C: CPT | Performed by: OBSTETRICS & GYNECOLOGY

## 2023-12-11 PROCEDURE — 85025 COMPLETE CBC W/AUTO DIFF WBC: CPT | Performed by: OBSTETRICS & GYNECOLOGY

## 2023-12-11 RX ORDER — IBUPROFEN 200 MG
24 TABLET ORAL
Status: DISCONTINUED | OUTPATIENT
Start: 2023-12-11 | End: 2023-12-15 | Stop reason: HOSPADM

## 2023-12-11 RX ORDER — INSULIN ASPART 100 [IU]/ML
6 INJECTION, SOLUTION INTRAVENOUS; SUBCUTANEOUS
Status: DISCONTINUED | OUTPATIENT
Start: 2023-12-12 | End: 2023-12-12

## 2023-12-11 RX ORDER — GLUCAGON 1 MG
1 KIT INJECTION
Status: DISCONTINUED | OUTPATIENT
Start: 2023-12-11 | End: 2023-12-15 | Stop reason: HOSPADM

## 2023-12-11 RX ORDER — IBUPROFEN 200 MG
16 TABLET ORAL
Status: DISCONTINUED | OUTPATIENT
Start: 2023-12-11 | End: 2023-12-15 | Stop reason: HOSPADM

## 2023-12-11 RX ORDER — INSULIN ASPART 100 [IU]/ML
8 INJECTION, SOLUTION INTRAVENOUS; SUBCUTANEOUS ONCE
Status: COMPLETED | OUTPATIENT
Start: 2023-12-11 | End: 2023-12-11

## 2023-12-11 RX ADMIN — INSULIN ASPART 8 UNITS: 100 INJECTION, SOLUTION INTRAVENOUS; SUBCUTANEOUS at 10:12

## 2023-12-11 RX ADMIN — INSULIN DETEMIR 20 UNITS: 100 INJECTION, SOLUTION SUBCUTANEOUS at 10:12

## 2023-12-12 PROBLEM — E10.65 TYPE 1 DIABETES MELLITUS WITH HYPERGLYCEMIA: Status: ACTIVE | Noted: 2023-08-16

## 2023-12-12 PROBLEM — Z3A.30 30 WEEKS GESTATION OF PREGNANCY: Status: ACTIVE | Noted: 2023-12-12

## 2023-12-12 LAB
ESTIMATED AVG GLUCOSE: 194 MG/DL (ref 68–131)
GLUCOSE SERPL-MCNC: 102 MG/DL (ref 70–110)
GLUCOSE SERPL-MCNC: 138 MG/DL (ref 70–110)
GLUCOSE SERPL-MCNC: 143 MG/DL (ref 70–110)
GLUCOSE SERPL-MCNC: 206 MG/DL (ref 70–110)
GLUCOSE SERPL-MCNC: 209 MG/DL (ref 70–110)
GLUCOSE SERPL-MCNC: 221 MG/DL (ref 70–110)
GLUCOSE SERPL-MCNC: 225 MG/DL (ref 70–110)
GLUCOSE SERPL-MCNC: 240 MG/DL (ref 70–110)
GLUCOSE SERPL-MCNC: 85 MG/DL (ref 70–110)
GLUCOSE SERPL-MCNC: 86 MG/DL (ref 70–110)
HBA1C MFR BLD: 8.4 % (ref 4.5–6.2)
PROT 24H UR-MRATE: 290 MG/SPEC (ref 6–100)
PROT UR-MCNC: 20 MG/DL (ref 0–15)
URINE COLLECTION DURATION: 24 HR
URINE VOLUME: 1450 ML

## 2023-12-12 PROCEDURE — 96372 THER/PROPH/DIAG INJ SC/IM: CPT | Performed by: HOSPITALIST

## 2023-12-12 PROCEDURE — 84156 ASSAY OF PROTEIN URINE: CPT | Performed by: OBSTETRICS & GYNECOLOGY

## 2023-12-12 PROCEDURE — 96372 THER/PROPH/DIAG INJ SC/IM: CPT | Performed by: STUDENT IN AN ORGANIZED HEALTH CARE EDUCATION/TRAINING PROGRAM

## 2023-12-12 PROCEDURE — 63600175 PHARM REV CODE 636 W HCPCS: Performed by: HOSPITALIST

## 2023-12-12 PROCEDURE — G0378 HOSPITAL OBSERVATION PER HR: HCPCS

## 2023-12-12 PROCEDURE — 59025 FETAL NON-STRESS TEST: CPT

## 2023-12-12 PROCEDURE — 63600175 PHARM REV CODE 636 W HCPCS: Performed by: STUDENT IN AN ORGANIZED HEALTH CARE EDUCATION/TRAINING PROGRAM

## 2023-12-12 RX ORDER — INSULIN ASPART 100 [IU]/ML
0-10 INJECTION, SOLUTION INTRAVENOUS; SUBCUTANEOUS
Status: DISCONTINUED | OUTPATIENT
Start: 2023-12-12 | End: 2023-12-13

## 2023-12-12 RX ORDER — INSULIN ASPART 100 [IU]/ML
4 INJECTION, SOLUTION INTRAVENOUS; SUBCUTANEOUS
Status: DISCONTINUED | OUTPATIENT
Start: 2023-12-12 | End: 2023-12-12

## 2023-12-12 RX ORDER — INSULIN ASPART 100 [IU]/ML
12 INJECTION, SOLUTION INTRAVENOUS; SUBCUTANEOUS
Status: DISCONTINUED | OUTPATIENT
Start: 2023-12-12 | End: 2023-12-13

## 2023-12-12 RX ORDER — INSULIN ASPART 100 [IU]/ML
7 INJECTION, SOLUTION INTRAVENOUS; SUBCUTANEOUS
Status: DISCONTINUED | OUTPATIENT
Start: 2023-12-12 | End: 2023-12-12

## 2023-12-12 RX ORDER — INSULIN ASPART 100 [IU]/ML
0-5 INJECTION, SOLUTION INTRAVENOUS; SUBCUTANEOUS
Status: DISCONTINUED | OUTPATIENT
Start: 2023-12-12 | End: 2023-12-12

## 2023-12-12 RX ADMIN — INSULIN ASPART 6 UNITS: 100 INJECTION, SOLUTION INTRAVENOUS; SUBCUTANEOUS at 07:12

## 2023-12-12 RX ADMIN — INSULIN ASPART 12 UNITS: 100 INJECTION, SOLUTION INTRAVENOUS; SUBCUTANEOUS at 04:12

## 2023-12-12 RX ADMIN — INSULIN ASPART 6 UNITS: 100 INJECTION, SOLUTION INTRAVENOUS; SUBCUTANEOUS at 03:12

## 2023-12-12 RX ADMIN — INSULIN ASPART 4 UNITS: 100 INJECTION, SOLUTION INTRAVENOUS; SUBCUTANEOUS at 11:12

## 2023-12-12 RX ADMIN — INSULIN ASPART 8 UNITS: 100 INJECTION, SOLUTION INTRAVENOUS; SUBCUTANEOUS at 09:12

## 2023-12-12 NOTE — H&P
Critical access hospital  Obstetrics  History & Physical    Patient Name: Yana Richards  MRN: [id number]  Admission Date: 2023  Primary Care Provider: Michelle Doherty APRN-CNP    Subjective:     Principal Problem:type 1 DM, pregnancy, poor control    History of Present Illness: Yana Richards is a 23 y.o.  female who is 30.5 weeks gestational age and a history of type 1 diabetes managed with an insulin pump.  Throughout her pregnancy she has been managed by her endocrinology physician's assistant.  However her last MFM ultrasound showed macrosomia with a fetal weight greater than the 99th percentile and polyhydramnios.  Her hemoglobin A1c also went from 7 to 8.4, and her MFM suggested that due to the signs of very poor glucose control she needed to be admitted and have the settings on her insulin pump adjusted accordingly so we can get good management for the last weeks of her pregnancy.  I discussed her admit with her endocrinology PA who says that they do not do inpatient consult and she offered me no suggestions for management.  Hospital medicine has been consulted, but they do not manage insulin pumps and currently have her on long-acting and regular insulin shots.  The patient is very uncomfortable with this since she has used an insulin pump for years and she would like to continue to use insulin pump after discharge.    She reports good fetal movement and she denies vaginal bleeding or rupture of membranes.  She does report sporadic [place] contractions.  Cervix was examined in the office yesterday and was long thick and closed.    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    OB History    Para  Term  AB Living   1 0 0 0 0 0   SAB IAB Ectopic Multiple Live Births   0 0 0 0 0      # Outcome Date GA Lbr Ronny/2nd Weight Sex Delivery Anes PTL Lv   1 Current              Past Medical History:   Diagnosis Date    Diabetes mellitus type I      Past Surgical History:   Procedure Laterality Date    main calf muscle removal  2013    TYMPANOSTOMY TUBE PLACEMENT Bilateral 2006       PTA Medications   Medication Sig    CONTOUR NEXT TEST STRIPS Strp Use four times daily.    DEXCOM G7 SENSOR Karly Change every 10 days.    glucagon (BAQSIMI) 3 mg/actuation Spry Spray 3 mg into one nostril. Repeat in opposite nostril if no response in 15 min.    insulin (LANTUS SOLOSTAR U-100 INSULIN) glargine 100 units/mL SubQ pen Inject 25 units into the skin daily in case of pump malfunction.    insulin lispro 100 unit/mL injection Use w/ insulin pump. MDD: 80 units    ondansetron (ZOFRAN-ODT) 4 MG TbDL Take 1 tablet (4 mg total) by mouth every 6 (six) hours as needed (nausea).    prenatal vit/iron fum/folic ac (PRENATAL 1+1 ORAL) Take by mouth.       Review of patient's allergies indicates:   Allergen Reactions    Vancomycin analogues Anaphylaxis        Family History       Problem Relation (Age of Onset)    Miscarriages / Stillbirths Mother    No Known Problems Father          Tobacco Use    Smoking status: Former     Types: Cigarettes    Smokeless tobacco: Never   Substance and Sexual Activity    Alcohol use: Not Currently     Alcohol/week: 1.0 standard drink of alcohol     Types: 1 Glasses of wine per week    Drug use: Never    Sexual activity: Yes     Review of Systems   Objective:     Vital Signs (Most Recent):  Temp: 97.5 °F (36.4 °C) (23)  Pulse: 95 (23)  Resp: 18 (23)  BP: 109/71 (23)  SpO2: 97 % (23) Vital Signs (24h Range):  Temp:  [97.5 °F (36.4 °C)-98.1 °F (36.7 °C)] 97.5 °F (36.4 °C)  Pulse:  [] 95  Resp:  [18] 18  SpO2:  [97 %-98 %] 97 %  BP:  "()/(60-83) 109/71     Weight: 90.7 kg (200 lb)  Body mass index is 36.58 kg/m².        Significant Labs:  No results found for: "GROUPTRH", "HEPBSAG", "RUBELLAIGGSC", "STREPBCULT", "AFP", "THUAEBO3ZD"    I have personallly reviewed all pertinent lab results from the last 24 hours.    Assessment/Plan:     23 y.o. female  at 30w5d for IUP at 30.5 weeks gestational age    Type 1 DM - poor control.  Currently she is being managed by hospital medicine, but they do not manage an insulin pump.  We are working on getting a telehealth consult from [facility] with an endocrinologist who can hopefully manage her pump for when she goes home.  Goals for her blood sugar are fasting less than 101 hour after meals less than 140.    Lower extremity edema and mildly elevated blood pressure in clinic yesterday-we will continue with 24 hour urine collection and blood pressure monitoring    Polyhydramnios-BPP yesterday was , we will continue with NST Q shift.    Ann Moore MD  Obstetrics  [facility]      "

## 2023-12-12 NOTE — NURSING
2300: Dr. Evangelista notified pt ordered to have nst Q shift. Request to review strip. Report given. Notified ultrasound at bedside for bpp. Ok to take pt off monitor. Notify with results.   2341: Dr. Evangelista notified of BPP results 8/8, aung 28.78. No new orders.

## 2023-12-12 NOTE — PLAN OF CARE
[facility]  Initial Discharge Assessment       Primary Care Provider: Michelle Doherty APRN-CNP    Admission Diagnosis: Supervision of normal IUP (intrauterine pregnancy) in multigravida, unspecified trimester [Z34.80]    Admission Date: 12/11/2023  Expected Discharge Date:     Pt is a 23-year-old female/male who arrived from home with No active principal problem. Information verified as correct on facesheet. The assessment was completed at the patient's bedside.  Pt lives with significant other, Derrek Razo [phone]. Pt does not have a Living Will or Advance Directive. The Pt is oriented to person, places, and times. PCP last seen yesterday  Pt denies Coumadin, dialysis, DME, HH, and has not been readmitted into the hospital in the last thirty days.  Pt is capable of performing ADLs without assistance. Pt's significant other drivers her to and from medical appointments. Pt reports she takes medication as prescribed; pharmacy used [facility].  Pt verbalized plan to discharge home via family transport. Pt has no other needs to be addressed at this time. CM reviewed the chart and will continue to monitor.       Transition of Care Barriers: None    Payor:  / Plan:  [employer] / Product Type: Government /     Extended Emergency Contact Information  Primary Emergency Contact: DanniDerrek  Mobile Phone: [phone]  Relation: Significant other   needed? No    Discharge Plan A: Home with family  Discharge Plan B: Home      [id number] [place] - [address]  [address] [address]  Phone: [phone] Fax: [phone]    [facility] - [place], [place] - [address]  [address] [address]  Phone: [phone] Fax: [phone]      Initial Assessment (most recent)       Adult Discharge Assessment - 12/12/23 1159          Discharge  Assessment    Assessment Type Discharge Planning Assessment     Confirmed/corrected address, phone number and insurance Yes     Confirmed Demographics Correct on Facesheet     Source of Information patient     When was your last doctors appointment? 12/11/23     Does patient/caregiver understand observation status Yes     Communicated OSCAR with patient/caregiver Date not available/Unable to determine     Reason For Admission No active principal problem     People in Home significant other     Facility Arrived From: home     Do you expect to return to your current living situation? Yes     Do you have help at home or someone to help you manage your care at home? Yes     Who are your caregiver(s) and their phone number(s)? Derrek Razo/significant other [phone]     Prior to hospitilization cognitive status: Alert/Oriented     Current cognitive status: Alert/Oriented     Walking or Climbing Stairs Difficulty no     Dressing/Bathing Difficulty no     Home Accessibility not wheelchair accessible     Equipment Currently Used at Home none     Readmission within 30 days? No     Patient currently being followed by outpatient case management? No     Do you currently have service(s) that help you manage your care at home? No     Do you take prescription medications? Yes     Do you have prescription coverage? Yes     Coverage Payor: [facility] -     Do you have any problems affording any of your prescribed medications? No     Who is going to help you get home at discharge? Derrek Razo/significant other [phone]     How do you get to doctors appointments? family or friend will provide     Are you on dialysis? No     Do you take coumadin? No     Discharge Plan A Home with family     Discharge Plan B Home     DME Needed Upon Discharge  none     Discharge Plan discussed with: Patient     Transition of Care Barriers None

## 2023-12-12 NOTE — CONSULTS
Consult for blood glucose control.  Pt states she does not have trouble counting carbs. Pt states she was sent in for blood glucose control. She is currently not on her pump and stated she has been giving more that her normal amounts and still with hyperglycemia. She is concern for her and her baby with elevated bloods sugars and is feeling bad as well.   Bolus rate:  1 units per 7 gm carb, however was giving 2 units per 7 gm.  Basal: 19.35 units  Glucose goal   MD adjusted insulin to improve glucose control. Pt also encouraged to see her endocrinologist. RD available for recommendations.

## 2023-12-12 NOTE — CONSULTS
Hospital Medicine was consulted for DM1 management.     Ms. Richards is a 23 year old woman with 31 weeks first pregnancy- pt was diagnosed with type 1 DM at the age of 12 then had episode of mild DKA too-     She has been using insulin pump at home but due to unavailability of Endocrinology appt she was trying to manage her insulin units on her own but as per labs sugars are still high.     ROS: no chest pain, sob, fever, chills, n/v, urine or bowel problem.     Exam: gravid uterus and rest of the exam unremarkable.     A/P:    Type 1 DM in pregnancy:   Will stop insulin pump- given the increased requirement of insulin during her 3rd trimester and pt needs to have continuous insulin supply  Will start her on Basal insulin Detemir 20 units at night then can adjust as per BG readings within a day.  Will also add prandial insulin TID 6 units with Accu checks as sliding scale every 4hr and if at any point at 4hrly BG above 120 we give 2 units of short acting insulin either lispro or aspart.     Goal will be to keep Fasting sugars below  and post meal below 140 and overall random BG below 120.     Please adjust insulin and give glucose if sugars drop below 70 and or pt gets symptomatic.     Thank you for the consult we will continue to follow with you.     Dr. Zamudio  Internal Medicine  [facility] Medicine  [facility]

## 2023-12-12 NOTE — ASSESSMENT & PLAN NOTE
I reviewed last clinic notes from [facility].  I reviewed the recommended pump settings.  I also spoke to the patient about what changes she herself made at home.  Due to elevated CBG's, patient doubled her CHO.  Taking essentially 2 units per 7 grams.  She made other changes, too.  None of those were brought to Ms. Denise's attention.  While patient's admitted, will do the following:  I will increase the pre-meal aspart to 12 U (based on the carbs per meal on 2200 calorie).  Will have her get CBG's 2-h postprandial and cover with moderate dose scale.  Will reduce detemir to 20 units.  Those changes may not lead to tight control, though.  She ideally needs to be evaluated by endocrinology for adjustments to basal-bolus dosing and adjustments to her pump settings.  Insulin pumps are not within my scope of practice.  I agree with the primary attending on plans to transfer to [facility] or [facility] where there are MFM and endocrinology services.

## 2023-12-12 NOTE — SUBJECTIVE & OBJECTIVE
Interval History:  CBG's are uncontrolled except for the fasting one this morning.  Patient is upset about that.  She feels that she would get tighter control with the pump.  She's getting basal-bolus insulin regimen in lieu of the pump.  Patient has a headache.  BP's are controlled.    Review of Systems   Respiratory:  Negative for shortness of breath.    Cardiovascular:  Negative for chest pain.     Objective:     Vital Signs (Most Recent):  Temp: 97.3 °F (36.3 °C) (12/12/23 1135)  Pulse: (!) 115 (12/12/23 1135)  Resp: 18 (12/12/23 1135)  BP: 102/61 (12/12/23 1135)  SpO2: 98 % (12/12/23 1135) Vital Signs (24h Range):  Temp:  [97.3 °F (36.3 °C)-98.1 °F (36.7 °C)] 97.3 °F (36.3 °C)  Pulse:  [] 115  Resp:  [18] 18  SpO2:  [97 %-98 %] 98 %  BP: ()/(60-83) 102/61     Weight: 90.7 kg (200 lb)  Body mass index is 36.58 kg/m².  No intake or output data in the 24 hours ending 12/12/23 1441      Physical Exam  Constitutional:       General: She is not in acute distress.     Appearance: She is not ill-appearing.      Comments: Pleasant, conversant   Neck:      Vascular: No JVD.   Cardiovascular:      Rate and Rhythm: Regular rhythm. Tachycardia present.   Pulmonary:      Effort: Pulmonary effort is normal.      Breath sounds: Normal breath sounds.   Musculoskeletal:      Right lower leg: Edema present.      Left lower leg: Edema present.   Skin:     General: Skin is warm and moist.      Findings: No rash.   Neurological:      Mental Status: She is alert and oriented to person, place, and time.   Psychiatric:         Attention and Perception: Attention normal.         Mood and Affect: Mood and affect normal.         Speech: Speech normal.             Significant Labs: All pertinent labs within the past 24 hours have been reviewed.

## 2023-12-12 NOTE — PROGRESS NOTES
OB progress note -     Dr. William Sosa with [facility] called to consult on the patient.  We discussed her history and the difficulty we are having finding someone to manage an insulin pump here.  He feels that in order to safely and efficiently manage the pump, the patient needs to be at an institution where he can physically see the pump and manage the settings.  She has been seeing MFM at [facility], so I will see if they can admit her there, or if we need to transfer her to [facility] where Dr. Sosa is.

## 2023-12-12 NOTE — NURSING
Nurses Note -- 4 Eyes      12/11/2023   8: 35 PM      Skin assessed during: Admit      [x] No Altered Skin Integrity Present    []Prevention Measures Documented      [] Yes- Altered Skin Integrity Present or Discovered   [] LDA Added if Not in Epic (Describe Wound)   [] New Altered Skin Integrity was Present on Admit and Documented in LDA   [] Wound Image Taken    Wound Care Consulted? No    Attending Nurse:   Raina Duque RN     Second RN/Staff Member:   Jeff Petersen RN

## 2023-12-12 NOTE — PROGRESS NOTES
[facility] Medicine  Progress Note    Patient Name: Yana Richards  MRN: [id number]  Patient Class: OP- Observation   Admission Date: 12/11/2023  Length of Stay: 0 days  Attending Physician: Ann Moore MD  Primary Care Provider: Michelle Doherty APRN-CNP        Subjective:     Principal Problem:Type 1 diabetes mellitus with hyperglycemia      Interval History:  CBG's are uncontrolled except for the fasting one this morning.  Patient is upset about that.  She feels that she would get tighter control with the pump.  She's getting basal-bolus insulin regimen in lieu of the pump.  Patient has a headache.  BP's are controlled.    Review of Systems   Respiratory:  Negative for shortness of breath.    Cardiovascular:  Negative for chest pain.     Objective:     Vital Signs (Most Recent):  Temp: 97.3 °F (36.3 °C) (12/12/23 1135)  Pulse: (!) 115 (12/12/23 1135)  Resp: 18 (12/12/23 1135)  BP: 102/61 (12/12/23 1135)  SpO2: 98 % (12/12/23 1135) Vital Signs (24h Range):  Temp:  [97.3 °F (36.3 °C)-98.1 °F (36.7 °C)] 97.3 °F (36.3 °C)  Pulse:  [] 115  Resp:  [18] 18  SpO2:  [97 %-98 %] 98 %  BP: ()/(60-83) 102/61     Weight: 90.7 kg (200 lb)  Body mass index is 36.58 kg/m².  No intake or output data in the 24 hours ending 12/12/23 1441      Physical Exam  Constitutional:       General: She is not in acute distress.     Appearance: She is not ill-appearing.      Comments: Pleasant, conversant   Neck:      Vascular: No JVD.   Cardiovascular:      Rate and Rhythm: Regular rhythm. Tachycardia present.   Pulmonary:      Effort: Pulmonary effort is normal.      Breath sounds: Normal breath sounds.   Musculoskeletal:      Right lower leg: Edema present.      Left lower leg: Edema present.   Skin:     General: Skin is warm and moist.      Findings: No rash.   Neurological:      Mental Status: She is alert and oriented to person, place, and time.   Psychiatric:         Attention and Perception:  Attention normal.         Mood and Affect: Mood and affect normal.         Speech: Speech normal.             Significant Labs: All pertinent labs within the past 24 hours have been reviewed.      Assessment/Plan:      * Type 1 diabetes mellitus with hyperglycemia  I reviewed last clinic notes from [facility].  I reviewed the recommended pump settings.  I also spoke to the patient about what changes she herself made at home.  Due to elevated CBG's, patient doubled her CHO.  Taking essentially 2 units per 7 grams.  She made other changes, too.  None of those were brought to Ms. Denise's attention.  While patient's admitted, will do the following:  I will increase the pre-meal aspart to 12 U (based on the carbs per meal on 2200 calorie).  Will have her get CBG's 2-h postprandial and cover with moderate dose scale.  Will reduce detemir to 20 units.  Those changes may not lead to tight control, though.  She ideally needs to be evaluated by endocrinology for adjustments to basal-bolus dosing and adjustments to her pump settings.  Insulin pumps are not within my scope of practice.  I agree with the primary attending on plans to transfer to [facility] or [facility] where there are MFM and endocrinology services.    30 weeks gestation of pregnancy  Management per OB        VTE Risk Mitigation (From admission, onward)      None            I will follow along with you while patient is admitted here.      Tre Johnson MD  Department of Hospital Medicine   [facility]

## 2023-12-13 LAB
ANION GAP SERPL CALC-SCNC: 8 MMOL/L (ref 8–16)
BACTERIA UR CULT: NORMAL
BACTERIA UR CULT: NORMAL
BUN SERPL-MCNC: 14 MG/DL (ref 6–20)
CALCIUM SERPL-MCNC: 11 MG/DL (ref 8.7–10.5)
CHLORIDE SERPL-SCNC: 104 MMOL/L (ref 95–110)
CO2 SERPL-SCNC: 23 MMOL/L (ref 23–29)
CREAT SERPL-MCNC: 0.5 MG/DL (ref 0.5–1.4)
EST. GFR  (NO RACE VARIABLE): >60 ML/MIN/1.73 M^2
GLUCOSE SERPL-MCNC: 118 MG/DL (ref 70–110)
GLUCOSE SERPL-MCNC: 140 MG/DL (ref 70–110)
GLUCOSE SERPL-MCNC: 143 MG/DL (ref 70–110)
GLUCOSE SERPL-MCNC: 144 MG/DL (ref 70–110)
GLUCOSE SERPL-MCNC: 152 MG/DL (ref 70–110)
GLUCOSE SERPL-MCNC: 162 MG/DL (ref 70–110)
GLUCOSE SERPL-MCNC: 174 MG/DL (ref 70–110)
GLUCOSE SERPL-MCNC: 178 MG/DL (ref 70–110)
GLUCOSE SERPL-MCNC: 183 MG/DL (ref 70–110)
GLUCOSE SERPL-MCNC: 210 MG/DL (ref 70–110)
POTASSIUM SERPL-SCNC: 3.9 MMOL/L (ref 3.5–5.1)
SODIUM SERPL-SCNC: 135 MMOL/L (ref 136–145)

## 2023-12-13 PROCEDURE — 80048 BASIC METABOLIC PNL TOTAL CA: CPT | Performed by: STUDENT IN AN ORGANIZED HEALTH CARE EDUCATION/TRAINING PROGRAM

## 2023-12-13 PROCEDURE — 25000003 PHARM REV CODE 250: Performed by: OBSTETRICS & GYNECOLOGY

## 2023-12-13 PROCEDURE — 63600175 PHARM REV CODE 636 W HCPCS: Performed by: STUDENT IN AN ORGANIZED HEALTH CARE EDUCATION/TRAINING PROGRAM

## 2023-12-13 PROCEDURE — G0378 HOSPITAL OBSERVATION PER HR: HCPCS

## 2023-12-13 PROCEDURE — 59025 FETAL NON-STRESS TEST: CPT

## 2023-12-13 PROCEDURE — 96372 THER/PROPH/DIAG INJ SC/IM: CPT | Performed by: STUDENT IN AN ORGANIZED HEALTH CARE EDUCATION/TRAINING PROGRAM

## 2023-12-13 PROCEDURE — 36415 COLL VENOUS BLD VENIPUNCTURE: CPT | Performed by: STUDENT IN AN ORGANIZED HEALTH CARE EDUCATION/TRAINING PROGRAM

## 2023-12-13 RX ORDER — INSULIN ASPART 100 [IU]/ML
0-15 INJECTION, SOLUTION INTRAVENOUS; SUBCUTANEOUS
Status: DISCONTINUED | OUTPATIENT
Start: 2023-12-13 | End: 2023-12-15 | Stop reason: HOSPADM

## 2023-12-13 RX ORDER — CALCIUM CARBONATE 200(500)MG
1000 TABLET,CHEWABLE ORAL 2 TIMES DAILY PRN
Status: DISCONTINUED | OUTPATIENT
Start: 2023-12-13 | End: 2023-12-15 | Stop reason: HOSPADM

## 2023-12-13 RX ORDER — INSULIN ASPART 100 [IU]/ML
0-15 INJECTION, SOLUTION INTRAVENOUS; SUBCUTANEOUS
Status: DISCONTINUED | OUTPATIENT
Start: 2023-12-13 | End: 2023-12-13

## 2023-12-13 RX ORDER — INSULIN ASPART 100 [IU]/ML
15 INJECTION, SOLUTION INTRAVENOUS; SUBCUTANEOUS
Status: DISCONTINUED | OUTPATIENT
Start: 2023-12-13 | End: 2023-12-14

## 2023-12-13 RX ORDER — ACETAMINOPHEN 500 MG
1000 TABLET ORAL EVERY 8 HOURS PRN
Status: DISCONTINUED | OUTPATIENT
Start: 2023-12-13 | End: 2023-12-15 | Stop reason: HOSPADM

## 2023-12-13 RX ADMIN — INSULIN ASPART 4 UNITS: 100 INJECTION, SOLUTION INTRAVENOUS; SUBCUTANEOUS at 01:12

## 2023-12-13 RX ADMIN — INSULIN ASPART 6 UNITS: 100 INJECTION, SOLUTION INTRAVENOUS; SUBCUTANEOUS at 08:12

## 2023-12-13 RX ADMIN — INSULIN ASPART 2 UNITS: 100 INJECTION, SOLUTION INTRAVENOUS; SUBCUTANEOUS at 09:12

## 2023-12-13 RX ADMIN — INSULIN ASPART 15 UNITS: 100 INJECTION, SOLUTION INTRAVENOUS; SUBCUTANEOUS at 11:12

## 2023-12-13 RX ADMIN — INSULIN ASPART 4 UNITS: 100 INJECTION, SOLUTION INTRAVENOUS; SUBCUTANEOUS at 02:12

## 2023-12-13 RX ADMIN — INSULIN ASPART 15 UNITS: 100 INJECTION, SOLUTION INTRAVENOUS; SUBCUTANEOUS at 05:12

## 2023-12-13 RX ADMIN — INSULIN ASPART 4 UNITS: 100 INJECTION, SOLUTION INTRAVENOUS; SUBCUTANEOUS at 09:12

## 2023-12-13 RX ADMIN — INSULIN ASPART 12 UNITS: 100 INJECTION, SOLUTION INTRAVENOUS; SUBCUTANEOUS at 07:12

## 2023-12-13 RX ADMIN — INSULIN ASPART 2 UNITS: 100 INJECTION, SOLUTION INTRAVENOUS; SUBCUTANEOUS at 02:12

## 2023-12-13 RX ADMIN — CALCIUM CARBONATE 1000 MG: 500 TABLET, CHEWABLE ORAL at 12:12

## 2023-12-13 RX ADMIN — ACETAMINOPHEN 1000 MG: 500 TABLET ORAL at 01:12

## 2023-12-13 NOTE — PROGRESS NOTES
After discussion with the patient, she is comfortable staying here to continue glucose management. She had a discussion with Dr. Johnson with [facility] medicine and Fransisco Denise her endocrine PA about getting her sugars controlled with long acting insulin and bolus before meals.  Once her insulin requirements are established, the patient is comfortable programming her pump accordingly.

## 2023-12-13 NOTE — ASSESSMENT & PLAN NOTE
Hospitalization 30 week intrauterine pregnancy with uncontrolled blood sugars.  Patient is being covered with sliding scale and then insulin pump will be managed with endocrine.  Patient doing well will look for NST today and read that.  Nothing new on obstetric front.  Fasting blood sugar this morning was still 144

## 2023-12-13 NOTE — SUBJECTIVE & OBJECTIVE
Obstetric HPI:  Patient doing well.  Complaining of lower a extremity edema.  Reports good fetal movement   Objective:     Vital Signs (Most Recent):  Temp: 98.3 °F (36.8 °C) (12/13/23 0512)  Pulse: 103 (12/13/23 0512)  Resp: 18 (12/13/23 0512)  BP: 122/79 (12/13/23 0512)  SpO2: 96 % (12/13/23 0512) Vital Signs (24h Range):  Temp:  [97.1 °F (36.2 °C)-98.3 °F (36.8 °C)] 98.3 °F (36.8 °C)  Pulse:  [103-118] 103  Resp:  [17-18] 18  SpO2:  [96 %-98 %] 96 %  BP: (102-135)/(60-91) 122/79     Weight: 90.7 kg (200 lb)  Body mass index is 36.58 kg/m².    FHTs:  NST to be preformed today        Intake/Output Summary (Last 24 hours) at 12/13/2023 0800  Last data filed at 12/12/2023 2013  Gross per 24 hour   Intake --   Output 1450 ml   Net -1450 ml            Significant Labs:  Recent Lab Results  (Last 5 results in the past 24 hours)        12/13/23  0713   12/13/23  0205   12/13/23  0044   12/12/23  2132   12/12/23 2013        Urine Protein, Timed         290       POC Glucose 143   178   174   206         PROTEIN URINE         20       Urine Collection Duration         24       Urine Total Volume         1450                              Physical Exam  Patient doing well ambulating  Vital signs stable afebrile  Extremities 2+ pitting edema patient is wearing compression socks    Review of Systems

## 2023-12-13 NOTE — CONSULTS
[facility] Medicine    Progress Note    Patient Name: Yana Richards  MRN: [id number]  Patient Class: OP- Observation   Admission Date: 12/11/2023  7:40 PM  Length of Stay: 0  Attending Physician: Sarah Velarde MD  Primary Care Provider: Michelle Doherty APRN-CNP  Face-to-Face encounter date: 12/13/2023      Subjective:    Chief Complaint:  Hyperglycemia    Principal problem:Type 1 diabetes mellitus with hyperglycemia     HPI:  Hospital Medicine was consulted for DM1 management.   Ms. Richards is a 23 year old woman with 31 weeks first pregnancy- pt was diagnosed with type 1 DM at the age of 12 then had episode of mild DKA too-   She has been using insulin pump at home but due to unavailability of Endocrinology appt she was trying to manage her insulin units on her own but as per labs sugars are still high.     Interval History:   12/13:  Yesterday, patient is pre-meal aspart was increased to 12 U (based on the carbs per meal on 2200 calorie) and detemir decrease to 20 units.  Sugar seems to be doing better this, however patient required more short-acting insulin.  Would increase aspart to 15 units meals.Family present at bedside.No concerns/issues overnight reported by the patient or the nursing staff.      PAST MEDICAL HISTORY:     Past Medical History:   Diagnosis Date    Diabetes mellitus type I        PAST SURGICAL HISTORY:     Past Surgical History:   Procedure Laterality Date    main calf muscle removal  08/21/2013    TYMPANOSTOMY TUBE PLACEMENT Bilateral 08/09/2006       ALLERGIES:   Vancomycin analogues    FAMILY HISTORY:     Family History   Problem Relation Age of Onset    Miscarriages / Stillbirths Mother     No Known Problems Father      Reviewed and non- contributory   SOCIAL HISTORY:     Social History     Tobacco Use    Smoking status: Former     Types: Cigarettes    Smokeless tobacco: Never   Substance Use Topics    Alcohol use: Not Currently     Alcohol/week: 1.0 standard drink  "of alcohol     Types: 1 Glasses of wine per week        Social History     Substance and Sexual Activity   Sexual Activity Yes        HOME MEDICATIONS:     Prior to Admission medications    Medication Sig Start Date End Date Taking? Authorizing Provider   CONTOUR NEXT TEST STRIPS Strp Use four times daily. 9/7/23   NIDHI Denise PA-C   DEXCOM G7 SENSOR Karly Change every 10 days. 9/7/23   NIDHI Denise PA-C   glucagon (BAQSIMI) 3 mg/actuation Spry Spray 3 mg into one nostril. Repeat in opposite nostril if no response in 15 min. 9/7/23   NIDHI Denise PA-C   insulin (LANTUS SOLOSTAR U-100 INSULIN) glargine 100 units/mL SubQ pen Inject 25 units into the skin daily in case of pump malfunction. 9/7/23   NIDHI Denise PA-C   insulin lispro 100 unit/mL injection Use w/ insulin pump. MDD: 80 units 9/7/23   NIDHI Denise PA-C   ondansetron (ZOFRAN-ODT) 4 MG TbDL Take 1 tablet (4 mg total) by mouth every 6 (six) hours as needed (nausea). 10/5/23   Kimo Connolly MD   prenatal vit/iron fum/folic ac (PRENATAL 1+1 ORAL) Take by mouth.    Provider, Historical       REVIEW OF SYSTEMS:   10 Point Review of System was performed and was found to be negative except for that mentioned already in the HPI above.     PHYSICAL EXAM:   /73 (BP Location: Right arm, Patient Position: Lying)   Pulse 109   Temp 97.9 °F (36.6 °C) (Oral)   Resp 18   Ht 5' 2" (1.575 m)   Wt 90.7 kg (200 lb)   SpO2 95%   Breastfeeding No   BMI 36.58 kg/m²     Vitals Reviewed  General appearance: Well-developed, well-nourished female in no apparent distress.  HENT: Atraumatic head. Moist mucous membranes of oral cavity.  Eyes: Normal extraocular movements.   Neck: Supple.   Lungs: Clear to auscultation bilaterally. No wheezing present.   Heart: Regular rate and rhythm. S1 and S2 present with no murmurs/gallop/rub. No pedal edema. No JVD present.   Abdomen: Soft, non-distended, non-tender. No rebound tenderness/guarding. Bowel sounds " "are normal.   Extremities: No cyanosis, clubbing, or edema.  Skin: No Rash.   Neuro: Motor and sensory exams grossly intact. Good tone. Muscle strength 5/5 in all 4 extremities  Psych/mental status: Appropriate mood and affect. Responds appropriately to questions.       Following labs were Reviewed   CBC:  No results for input(s): "WBC", "HGB", "HCT", "PLT" in the last 24 hours.  CMP:  No results for input(s): "GLUCOSE", "CALCIUM", "ALBUMIN", "PROT", "NA", "K", "CO2", "CL", "BUN", "CREATININE", "ALKPHOS", "ALT", "AST", "BILITOT" in the last 24 hours.    Micro Results  Microbiology Results (last 7 days)       Procedure Component Value Units Date/Time    Urine culture [[id number]] Collected: 12/11/23 2013    Order Status: Completed Specimen: Urine Updated: 12/13/23 0749     Urine Culture, Routine Multiple organisms isolated. None in predominance.  Repeat if      clinically necessary.    Narrative:      Specimen Source->Urine             Radiology Reports  US OB 14+ Weeks TransAbd, w/Biophysical Profile, w/o NST, Single Gestation (xpd)    Result Date: 12/12/2023  EXAM: US Biophysical Profile Without Non-Stress Testing CLINICAL HISTORY: The patient is 23 years old and is Female; uncontrolled type 1 diabetic TECHNIQUE: Real-time ultrasound of the maternal pelvis for fetal biophysical profile evaluation with image documentation. COMPARISON: No relevant prior studies available. FINDINGS: FETUS:  Single intrauterine pregnancy. HEART RATE:  Fetal heart rate is 165 bpm. PRESENTATION:  Fetal presentation is cephalic. PLACENTA:  The placenta is posterior. FETAL BREATHING MOVEMENTS:  Present.  Score 2/2. GROSS FETAL BODY MOVEMENTS:  Present.  Score 2/2. FETAL TONE:  Present.  Score 2/2. QUALITATIVE AMNIOTIC FLUID VOLUME:  ERIN is 28.8 cm. Score 2/2. IMPRESSION: 1.  Single live intrauterine pregnancy with biophysical profile score of 8/8. 2. Polyhydramnios. Electronically signed by:  Lyubov Tate MD  12/12/2023 12:36 AM CST " Workstation: [id number]    US OB FU Ea Gestation Transabdominal    Result Date: 2023  This result has an attachment that is not available. Indication ======== follow-up for fetal growth Type 1 Diabetes Mellitus History ====== General History Blood group: B Rhesus: Rh positive Blood group details: antibody negative Smoking: No Height 157 cm Height (ft) 5 ft Height (in) 2 in  Medical History Allergies: Allergies identified Allergy: vancomycin Past medical history: Previous diseases identified Disease: IDDM Past surgical history: Previous surgeries performed Surgery: Ear tubes Surgery: tumor removal from calf Infections: No infections identified Previous Outcomes  1 Para 0 Family History Relative: none Medication Medication: humalog pump Details: basal rate 19.36units/per day, has DEXCOM Medication: prenatal vitamins Maternal Assessment ================= Height 157 cm Height (ft) 5 ft Height (in) 2 in Weight 88 kg Weight (lb) 195 lb Weight gain 25 kg Weight gain (lb) 55 lb BMI 35.67 kg/m² BP syst 120 mmHg BP diast 81 mmHg Heart rate 111 bpm Physical Exam Initial weight 64 kg Initial weight (lb) 140 lb Initial BMI 25.61 kg/m² Maternal assessment other: denies vaginal bleeding and cramping at this time Method ====== Transabdominal ultrasound examination. View: Good view Pregnancy ========= Eric pregnancy. Number of fetuses: 1 Dating ====== GA by prior assessment 30 w + 1 d OSCAR by prior assessment: 2024 Ultrasound examination on: 2023 GA by U/S based upon: AC, BPD, Femur, HC GA by U/S 33 w + 4 d OSCAR by U/S: 2024 Assigned: based on stated OSCAR, selected on 2023 Assigned GA 30 w + 1 d Assigned OSCAR: 2024 General Evaluation ============== Cardiac activity present.  bpm. Fetal movements: present. Presentation: cephalic Placenta: Placental site: posterior Amniotic fluid: MVP 10.4 cm. ERIN 29.6 cm. Q1 6.3 cm, Q2 10.4 cm, Q3 7.0 cm, Q4 6.0 cm , polyhydramnios Biophysical Profile  ============== 2: Fetal breathing movements 2: Gross body movements 2: Fetal tone 2: Amniotic fluid volume 8/8 Biophysical profile score  Interpretation: normal Fetal Biometry ============ Standard BPD 86.0 mm 34w 5d        >99%        Hadlock .3 mm 34w 1d        >99%        Perry .2 mm 33w 5d        96%        Hadlock .3 mm 33w 1d        99%        Hadlock Femur 62.7 mm 32w 3d        91%        Hadlock HC / AC 1.04 EFW 2,126 g 32w 6d        >99%        Hadlock EFW (lb) 4 lb EFW (oz) 11 oz EFW by: Hadlock (BPD-HC-AC-FL) Extremities / Bony Struc FL / BPD 0.73 FL / HC 0.21 FL / AC 0.22 Other Structures  bpm Fetal Anatomy =========== Stomach: normal Kidneys: normal Bladder: normal Wants to know fetal sex: yes Impression ========= Composite fetal growth is at the >99% percentile - fetus is symmetrically LGA. Interval growth is noted. ERIN measures above normal range with a composite ERIN of 29+ cm and MVP of 10cm. The biophysical profile is 8/8 with normal breathing motion, body motion, tone and amniotic fluid volume. Fetal anatomy remains intact on follow-up today. Recommendation ============== Ms. Richards is status post a normal screening fetal echocardiogram with Dr. Hills at [facility]. Her diabetic managment remains focused on titration of her insulin pump by her endocrinologist based on Dexom surveillance. She is not doing accuchecks and her Dexom reading are only transmitted to her endocrinologist. She notes having higher range readings and her most recent follow-up HgbA1c increased to 8. She also notes increased frequency of needing supplemental insulin following meals. Clinical findings on fetal imaging today are consistent with significantly suboptimal glycemic control - polyhydramnios and LGA fetal growth. She does note increased intermittent contractions but not over PTL. Discussed at length with patient and her partner and also reviewed clinical findings and concerns by phone with   Oscar. In terms of fetal surveillance, I recommend weekly BPP and ERIN assessments (will be done in Dr. Moore's office), and we will plan follow-up fetal growth surveillance in 4 weeks. Additionally recommend planning hospital antepartum admission for maternal glucose control titration - Dr. Moore will arrange. Consistent glycemic targets are important for fetal safety. Based on carlee clinical course would recommend planning delivery 36-37 weeks gestation if fetal status remains reassuring. If she experiences overt PTL or maternal respiratory difficulty associated with polyhydramnios she may need a therapeutic amniocentesis for amnioreduction. Again clinical management considerations reviewed in detail with Ms. Richards and discussed by phone with Dr. Moore.       Meds  Scheduled Meds:   insulin aspart U-100  12 Units Subcutaneous TIDWM    insulin detemir U-100  20 Units Subcutaneous QHS     Continuous Infusions:  PRN Meds:.acetaminophen, calcium carbonate, dextrose 50%, dextrose 50%, glucagon (human recombinant), glucose, glucose, insulin aspart U-100.    Assessment & Plan:     Active Hospital Problems    Diagnosis    *Type 1 diabetes mellitus with hyperglycemia  Increase aspart to 15 units with meals  Insulin sliding scale  Regular Accu-Chek      30 weeks gestation of pregnancy  Management per Ob         Thank you for your consult. I will follow-up with patient. Please contact us if you have any additional questions  Sarah Velarde MD  Department of Hospital Medicine   [facility]

## 2023-12-13 NOTE — PROGRESS NOTES
[facility]  Obstetrics  Antepartum Progress Note    Patient Name: Yana Richards  MRN: [id number]  Admission Date: 2023  Hospital Length of Stay: 0 days  Attending Physician: Sarah Velarde MD  Primary Care Provider: Michelle Doherty APRN-CNP    Subjective:     Principal Problem:Type 1 diabetes mellitus with hyperglycemia    HPI:  No notes on file    Hospital Course:  No notes on file    Obstetric HPI:  Patient doing well.  Complaining of lower a extremity edema.  Reports good fetal movement   Objective:     Vital Signs (Most Recent):  Temp: 98.3 °F (36.8 °C) (23)  Pulse: 103 (23)  Resp: 18 (23)  BP: 122/79 (23)  SpO2: 96 % (23) Vital Signs (24h Range):  Temp:  [97.1 °F (36.2 °C)-98.3 °F (36.8 °C)] 98.3 °F (36.8 °C)  Pulse:  [103-118] 103  Resp:  [17-18] 18  SpO2:  [96 %-98 %] 96 %  BP: (102-135)/(60-91) 122/79     Weight: 90.7 kg (200 lb)  Body mass index is 36.58 kg/m².    FHTs:  NST to be preformed today        Intake/Output Summary (Last 24 hours) at 2023 0800  Last data filed at 2023  Gross per 24 hour   Intake --   Output 1450 ml   Net -1450 ml            Significant Labs:  Recent Lab Results  (Last 5 results in the past 24 hours)        23  0713   23  0205   23  0044   23  2132   23        Urine Protein, Timed         290       POC Glucose 143   178   174   206         PROTEIN URINE         20       Urine Collection Duration         24       Urine Total Volume         1450                              Physical Exam  Patient doing well ambulating  Vital signs stable afebrile  Extremities 2+ pitting edema patient is wearing compression socks    Review of Systems  Assessment/Plan:     23 y.o. female  at 30w6d for:    * Type 1 diabetes mellitus with hyperglycemia  Hospitalization 30 week intrauterine pregnancy with uncontrolled blood sugars.  Patient is being covered with sliding  scale and then insulin pump will be managed with endocrine.  Patient doing well will look for NST today and read that.  Nothing new on obstetric front.  Fasting blood sugar this morning was still 144          Meeta Jerome MD  Obstetrics  [facility]

## 2023-12-13 NOTE — HPI
Hospital Medicine was consulted for DM1 management.   Ms. Richards is a 23 year old woman with 31 weeks first pregnancy- pt was diagnosed with type 1 DM at the age of 12 then had episode of mild DKA too-   She has been using insulin pump at home but due to unavailability of Endocrinology appt she was trying to manage her insulin units on her own but as per labs sugars are still high.

## 2023-12-14 LAB
GLUCOSE SERPL-MCNC: 153 MG/DL (ref 70–110)
GLUCOSE SERPL-MCNC: 217 MG/DL (ref 70–110)
GLUCOSE SERPL-MCNC: 55 MG/DL (ref 70–110)
GLUCOSE SERPL-MCNC: 66 MG/DL (ref 70–110)
GLUCOSE SERPL-MCNC: 84 MG/DL (ref 70–110)

## 2023-12-14 PROCEDURE — 12000002 HC ACUTE/MED SURGE SEMI-PRIVATE ROOM

## 2023-12-14 PROCEDURE — 25000003 PHARM REV CODE 250: Performed by: OBSTETRICS & GYNECOLOGY

## 2023-12-14 RX ORDER — INSULIN ASPART 100 [IU]/ML
20 INJECTION, SOLUTION INTRAVENOUS; SUBCUTANEOUS
Status: DISCONTINUED | OUTPATIENT
Start: 2023-12-14 | End: 2023-12-15 | Stop reason: HOSPADM

## 2023-12-14 RX ORDER — SIMETHICONE 80 MG
1 TABLET,CHEWABLE ORAL 4 TIMES DAILY PRN
Status: DISCONTINUED | OUTPATIENT
Start: 2023-12-14 | End: 2023-12-15 | Stop reason: HOSPADM

## 2023-12-14 RX ADMIN — INSULIN ASPART 20 UNITS: 100 INJECTION, SOLUTION INTRAVENOUS; SUBCUTANEOUS at 06:12

## 2023-12-14 RX ADMIN — INSULIN ASPART 3 UNITS: 100 INJECTION, SOLUTION INTRAVENOUS; SUBCUTANEOUS at 03:12

## 2023-12-14 RX ADMIN — INSULIN ASPART 6 UNITS: 100 INJECTION, SOLUTION INTRAVENOUS; SUBCUTANEOUS at 11:12

## 2023-12-14 RX ADMIN — SIMETHICONE 80 MG: 80 TABLET, CHEWABLE ORAL at 10:12

## 2023-12-14 RX ADMIN — ACETAMINOPHEN 1000 MG: 500 TABLET ORAL at 04:12

## 2023-12-14 RX ADMIN — SIMETHICONE 80 MG: 80 TABLET, CHEWABLE ORAL at 03:12

## 2023-12-14 RX ADMIN — INSULIN ASPART 15 UNITS: 100 INJECTION, SOLUTION INTRAVENOUS; SUBCUTANEOUS at 09:12

## 2023-12-14 RX ADMIN — INSULIN ASPART 20 UNITS: 100 INJECTION, SOLUTION INTRAVENOUS; SUBCUTANEOUS at 12:12

## 2023-12-14 NOTE — PROGRESS NOTES
Formerly [facility], [facility] Medicine    Progress Note    Patient Name: Yana Richards  MRN: [id number]  Patient Class: IP- Inpatient   Admission Date: 12/11/2023  7:40 PM  Length of Stay: 0  Attending Physician: Sarah Velarde MD  Primary Care Provider: Michelle Doherty APRN-CNP  Face-to-Face encounter date: 12/14/2023      Subjective:    Chief Complaint:  Hyperglycemia    Principal problem:Type 1 diabetes mellitus with hyperglycemia     HPI:  Hospital Medicine was consulted for DM1 management.   Ms. Richards is a 23 year old woman with 31 weeks first pregnancy- pt was diagnosed with type 1 DM at the age of 12 then had episode of mild DKA too-   She has been using insulin pump at home but due to unavailability of Endocrinology appt she was trying to manage her insulin units on her own but as per labs sugars are still high.     Interval History:   12/13:  Yesterday, patient is pre-meal aspart was increased to 12 U (based on the carbs per meal on 2200 calorie) and detemir decrease to 20 units.  Sugar seems to be doing better this, however patient required more short-acting insulin.  Would increase aspart to 15 units meals.Family present at bedside.No concerns/issues overnight reported by the patient or the nursing staff.    12/14:  NovoLog increased to 20 units t.i.d. with meals.  Diabetic diet changed to 2000 calories.      PAST MEDICAL HISTORY:     Past Medical History:   Diagnosis Date    Diabetes mellitus type I        PAST SURGICAL HISTORY:     Past Surgical History:   Procedure Laterality Date    main calf muscle removal  08/21/2013    TYMPANOSTOMY TUBE PLACEMENT Bilateral 08/09/2006       ALLERGIES:   Vancomycin analogues    FAMILY HISTORY:     Family History   Problem Relation Age of Onset    Miscarriages / Stillbirths Mother     No Known Problems Father      Reviewed and non- contributory   SOCIAL HISTORY:     Social History     Tobacco Use    Smoking status: Former     Types: Cigarettes    Smokeless  "tobacco: Never   Substance Use Topics    Alcohol use: Not Currently     Alcohol/week: 1.0 standard drink of alcohol     Types: 1 Glasses of wine per week        Social History     Substance and Sexual Activity   Sexual Activity Yes        HOME MEDICATIONS:     Prior to Admission medications    Medication Sig Start Date End Date Taking? Authorizing Provider   CONTOUR NEXT TEST STRIPS Strp Use four times daily. 9/7/23   NIDHI Denise PA-C   DEXCOM G7 SENSOR Karly Change every 10 days. 9/7/23   NIDHI Denise PA-C   glucagon (BAQSIMI) 3 mg/actuation Spry Spray 3 mg into one nostril. Repeat in opposite nostril if no response in 15 min. 9/7/23   NIDHI Denise PA-C   insulin (LANTUS SOLOSTAR U-100 INSULIN) glargine 100 units/mL SubQ pen Inject 25 units into the skin daily in case of pump malfunction. 9/7/23   NIDHI Denise PA-C   insulin lispro 100 unit/mL injection Use w/ insulin pump. MDD: 80 units 9/7/23   NIDHI Denise PA-C   ondansetron (ZOFRAN-ODT) 4 MG TbDL Take 1 tablet (4 mg total) by mouth every 6 (six) hours as needed (nausea). 10/5/23   Kimo Connolly MD   prenatal vit/iron fum/folic ac (PRENATAL 1+1 ORAL) Take by mouth.    Provider, Historical       REVIEW OF SYSTEMS:   10 Point Review of System was performed and was found to be negative except for that mentioned already in the HPI above.     PHYSICAL EXAM:   /69 (BP Location: Right arm, Patient Position: Lying)   Pulse 110   Temp 97.9 °F (36.6 °C) (Oral)   Resp 17   Ht 5' 2" (1.575 m)   Wt 90.7 kg (200 lb)   SpO2 97%   Breastfeeding No   BMI 36.58 kg/m²     Vitals Reviewed  General appearance: Well-developed, well-nourished female in no apparent distress.  HENT: Atraumatic head. Moist mucous membranes of oral cavity.  Eyes: Normal extraocular movements.   Neck: Supple.   Lungs: Clear to auscultation bilaterally. No wheezing present.   Heart: Regular rate and rhythm. S1 and S2 present with no murmurs/gallop/rub. No pedal edema. No " "JVD present.   Abdomen: Soft, non-distended, non-tender. No rebound tenderness/guarding. Bowel sounds are normal.   Extremities: No cyanosis, clubbing, or edema.  Skin: No Rash.   Neuro: Motor and sensory exams grossly intact. Good tone. Muscle strength 5/5 in all 4 extremities  Psych/mental status: Appropriate mood and affect. Responds appropriately to questions.       Following labs were Reviewed   CBC:  No results for input(s): "WBC", "HGB", "HCT", "PLT" in the last 24 hours.  CMP:  Recent Labs   Lab 12/13/23  1614   CALCIUM 11.0*   *   K 3.9   CO2 23      BUN 14   CREATININE 0.5       Micro Results  Microbiology Results (last 7 days)       Procedure Component Value Units Date/Time    Urine culture [[id number]] Collected: 12/11/23 2013    Order Status: Completed Specimen: Urine Updated: 12/13/23 0749     Urine Culture, Routine Multiple organisms isolated. None in predominance.  Repeat if      clinically necessary.    Narrative:      Specimen Source->Urine             Radiology Reports  US OB 14+ Weeks TransAbd, w/Biophysical Profile, w/o NST, Single Gestation (xpd)    Result Date: 12/12/2023  EXAM: US Biophysical Profile Without Non-Stress Testing CLINICAL HISTORY: The patient is 23 years old and is Female; uncontrolled type 1 diabetic TECHNIQUE: Real-time ultrasound of the maternal pelvis for fetal biophysical profile evaluation with image documentation. COMPARISON: No relevant prior studies available. FINDINGS: FETUS:  Single intrauterine pregnancy. HEART RATE:  Fetal heart rate is 165 bpm. PRESENTATION:  Fetal presentation is cephalic. PLACENTA:  The placenta is posterior. FETAL BREATHING MOVEMENTS:  Present.  Score 2/2. GROSS FETAL BODY MOVEMENTS:  Present.  Score 2/2. FETAL TONE:  Present.  Score 2/2. QUALITATIVE AMNIOTIC FLUID VOLUME:  ERIN is 28.8 cm. Score 2/2. IMPRESSION: 1.  Single live intrauterine pregnancy with biophysical profile score of 8/8. 2. Polyhydramnios. Electronically signed by:  " Lyubov Tate MD  2023 12:36 AM CST Workstation: [id number]    US OB FU Ea Gestation Transabdominal    Result Date: 2023  This result has an attachment that is not available. Indication ======== follow-up for fetal growth Type 1 Diabetes Mellitus History ====== General History Blood group: B Rhesus: Rh positive Blood group details: antibody negative Smoking: No Height 157 cm Height (ft) 5 ft Height (in) 2 in  Medical History Allergies: Allergies identified Allergy: vancomycin Past medical history: Previous diseases identified Disease: IDDM Past surgical history: Previous surgeries performed Surgery: Ear tubes Surgery: tumor removal from calf Infections: No infections identified Previous Outcomes  1 Para 0 Family History Relative: none Medication Medication: humalog pump Details: basal rate 19.36units/per day, has DEXCOM Medication: prenatal vitamins Maternal Assessment ================= Height 157 cm Height (ft) 5 ft Height (in) 2 in Weight 88 kg Weight (lb) 195 lb Weight gain 25 kg Weight gain (lb) 55 lb BMI 35.67 kg/m² BP syst 120 mmHg BP diast 81 mmHg Heart rate 111 bpm Physical Exam Initial weight 64 kg Initial weight (lb) 140 lb Initial BMI 25.61 kg/m² Maternal assessment other: denies vaginal bleeding and cramping at this time Method ====== Transabdominal ultrasound examination. View: Good view Pregnancy ========= Eric pregnancy. Number of fetuses: 1 Dating ====== GA by prior assessment 30 w + 1 d OSCAR by prior assessment: 2024 Ultrasound examination on: 2023 GA by U/S based upon: AC, BPD, Femur, HC GA by U/S 33 w + 4 d OSCAR by U/S: 2024 Assigned: based on stated OSCAR, selected on 2023 Assigned GA 30 w + 1 d Assigned OSCAR: 2024 General Evaluation ============== Cardiac activity present.  bpm. Fetal movements: present. Presentation: cephalic Placenta: Placental site: posterior Amniotic fluid: MVP 10.4 cm. ERIN 29.6 cm. Q1 6.3 cm, Q2 10.4 cm, Q3 7.0 cm, Q4 6.0 cm  , polyhydramnios Biophysical Profile ============== 2: Fetal breathing movements 2: Gross body movements 2: Fetal tone 2: Amniotic fluid volume 8/8 Biophysical profile score  Interpretation: normal Fetal Biometry ============ Standard BPD 86.0 mm 34w 5d        >99%        Hadlock .3 mm 34w 1d        >99%        Perry .2 mm 33w 5d        96%        Hadlock .3 mm 33w 1d        99%        Hadlock Femur 62.7 mm 32w 3d        91%        Hadlock HC / AC 1.04 EFW 2,126 g 32w 6d        >99%        Hadlock EFW (lb) 4 lb EFW (oz) 11 oz EFW by: Hadlock (BPD-HC-AC-FL) Extremities / Bony Struc FL / BPD 0.73 FL / HC 0.21 FL / AC 0.22 Other Structures  bpm Fetal Anatomy =========== Stomach: normal Kidneys: normal Bladder: normal Wants to know fetal sex: yes Impression ========= Composite fetal growth is at the >99% percentile - fetus is symmetrically LGA. Interval growth is noted. ERIN measures above normal range with a composite ERIN of 29+ cm and MVP of 10cm. The biophysical profile is 8/8 with normal breathing motion, body motion, tone and amniotic fluid volume. Fetal anatomy remains intact on follow-up today. Recommendation ============== Ms. Richards is status post a normal screening fetal echocardiogram with Dr. Hills at [facility]. Her diabetic managment remains focused on titration of her insulin pump by her endocrinologist based on Dexom surveillance. She is not doing accuchecks and her Dexom reading are only transmitted to her endocrinologist. She notes having higher range readings and her most recent follow-up HgbA1c increased to 8. She also notes increased frequency of needing supplemental insulin following meals. Clinical findings on fetal imaging today are consistent with significantly suboptimal glycemic control - polyhydramnios and LGA fetal growth. She does note increased intermittent contractions but not over PTL. Discussed at length with patient and her partner and also reviewed clinical findings  and concerns by phone with Dr. Moore. In terms of fetal surveillance, I recommend weekly BPP and ERIN assessments (will be done in Dr. Moore's office), and we will plan follow-up fetal growth surveillance in 4 weeks. Additionally recommend planning hospital antepartum admission for maternal glucose control titration - Dr. Moore will arrange. Consistent glycemic targets are important for fetal safety. Based on carlee clinical course would recommend planning delivery 36-37 weeks gestation if fetal status remains reassuring. If she experiences overt PTL or maternal respiratory difficulty associated with polyhydramnios she may need a therapeutic amniocentesis for amnioreduction. Again clinical management considerations reviewed in detail with Ms. Maurice and discussed by phone with Dr. Moore.       Meds  Scheduled Meds:   insulin aspart U-100  20 Units Subcutaneous TIDWM    insulin detemir U-100  25 Units Subcutaneous QHS     Continuous Infusions:  PRN Meds:.acetaminophen, calcium carbonate, dextrose 50%, dextrose 50%, glucagon (human recombinant), glucose, glucose, insulin aspart U-100, simethicone.    Assessment & Plan:     Active Hospital Problems    Diagnosis    *Type 1 diabetes mellitus with hyperglycemia  Increase aspart to 20 units with meals  Insulin sliding scale  Regular Accu-Chek      30 weeks gestation of pregnancy  Management per Ob         Thank you for your consult. I will follow-up with patient. Please contact us if you have any additional questions  Jose Oakley MD  Department of Hospital Medicine   [facility]

## 2023-12-14 NOTE — NURSING
Pt was complaining of her IV site hurting her. No redness noted, but swelling around the site was observed. IV removed. Attempted x1 for another access site, which was unsuccessful. L&D nurse at bedside and also could not find a good site for an IV. Pt is currently refusing another IV. Verbalizes understanding of the importance of having an access site, pt is still refusing at this point.

## 2023-12-14 NOTE — PROGRESS NOTES
Spoke with pt re: blood glucose and carb intake. BG levels better than yesterday but still slightly elevated. Provided pt menu and reviewed carb choices. Provided kitchen and RD # to make changes as needed to help with blood glucose control. RD available prn.    normal balance

## 2023-12-14 NOTE — PROGRESS NOTES
OB note    Patient is resting comfortably this morning without complaints.  She reports good fetal movement, no vaginal bleeding, no rupture of membranes and no consistent contractions.  Dietary consult yesterday discussed with her the importance of eating the correct number of carbs with each meal so we can determine her insulin requirements.  The patient's IV infiltrated yesterday and had to be removed.  Attempts to place a new IV were unsuccessful and the patient refused any further attempts.  She was counseled on the importance of IV access and continued to refuse.    VSS, AF, BP mildly elevated    Gen - NAD  Uterus - soft, gravid non tender  Ext - 1+ edema bilateral lower extremity    24 hour urine protein 290    A/p- IUP at 31 wga with type 1 DM    Blood glucose trending down nicely.  Will continue to watch/adjust insulin accordingly throughout the day today and likely d/c home tomorrow.  In the interest of avoiding complications for mom and baby, sugars ideally should be less than 100 fasting and less than 120 two hours after meals, although these goals  can be very difficult to obtain for a brittle type 1 diabetic.  Thanks for hospital medicine management.      Slightly high BP - 24 hour urine does not meet criteria for preeclampsia.  Will continue to monitor very closely.

## 2023-12-15 VITALS
TEMPERATURE: 98 F | DIASTOLIC BLOOD PRESSURE: 86 MMHG | HEART RATE: 108 BPM | HEIGHT: 62 IN | WEIGHT: 200 LBS | RESPIRATION RATE: 18 BRPM | OXYGEN SATURATION: 97 % | BODY MASS INDEX: 36.8 KG/M2 | SYSTOLIC BLOOD PRESSURE: 137 MMHG

## 2023-12-15 PROCEDURE — 59025 FETAL NON-STRESS TEST: CPT

## 2023-12-15 NOTE — DISCHARGE SUMMARY
[facility] Medicine  Discharge Summary      Patient Name: Yana Richards  MRN: [id number]  YUKO: [id number]  Patient Class: IP- Inpatient  Admission Date: 12/11/2023  Hospital Length of Stay: 1 days  Discharge Date and Time:  12/15/2023 12:50 PM  Attending Physician: No att. providers found   Discharging Provider: Jose Oakley MD  Primary Care Provider: Michelle Doherty APRN-CNP    Primary Care Team: Networked reference to record PCT     HPI:   Hospital Medicine was consulted for DM1 management.   Ms. Richards is a 23 year old woman with 31 weeks first pregnancy- pt was diagnosed with type 1 DM at the age of 12 then had episode of mild DKA too-   She has been using insulin pump at home but due to unavailability of Endocrinology appt she was trying to manage her insulin units on her own but as per labs sugars are still high.       * No surgery found *      Hospital Course:   Patient's blood sugars were well controlled during stay.  The night prior to discharge, patient started using her insulin pump as instructed.  Blood glucose remains well controlled on day of discharge.  Patient discharged home on December 15th.  Patient will follow up with endocrinology and maternal fetal medicine.     Goals of Care Treatment Preferences:         Consults:   Consults (From admission, onward)          Status Ordering Provider     Inpatient consult to Registered Dietitian/Nutritionist  Once        Provider:  (Not yet assigned)    Completed AL SUMMERS     Inpatient consult to Hospitalist  Once        Provider:  Al Summers MD    Completed HEATH GARCIA            No new Assessment & Plan notes have been filed under this hospital service since the last note was generated.  Service: Hospital Medicine    Final Active Diagnoses:    Diagnosis Date Noted POA    PRINCIPAL PROBLEM:  Type 1 diabetes mellitus with hyperglycemia [E10.65] 08/16/2023 Yes    30 weeks gestation of pregnancy [Z3A.30] 12/12/2023 Not  Applicable      Problems Resolved During this Admission:       Discharged Condition: good    Disposition: Home or Self Care    Follow Up:   Follow-up Information       Ann Moore MD. Go in 3 day(s).    Specialties: Obstetrics, Obstetrics and Gynecology  Why: Follow up with MD Moore  Contact information:  [address] [facility]  [address]  [facility]  [facility] [address]  [phone]                           Patient Instructions:      Ambulatory referral/consult to Perinatology (Maternal Fetal Medicine)   Referral Priority: Urgent Referral Type: Consultation   Referral Reason: Specialty Services Required   Requested Specialty: Perinatology   Number of Visits Requested: 1       Significant Diagnostic Studies: N/A    Pending Diagnostic Studies:       None           Medications:  Reconciled Home Medications:      Medication List        CONTINUE taking these medications      BAQSIMI 3 mg/actuation Spry  Generic drug: glucagon  Spray 3 mg into one nostril. Repeat in opposite nostril if no response in 15 min.     CONTOUR NEXT TEST STRIPS Strp  Generic drug: blood sugar diagnostic  Use four times daily.     DEXCOM G7 SENSOR Karly  Generic drug: blood-glucose sensor  Change every 10 days.     insulin glargine 100 units/mL SubQ pen  Commonly known as: LANTUS SOLOSTAR U-100 INSULIN  Inject 25 units into the skin daily in case of pump malfunction.     insulin lispro 100 unit/mL injection  Use w/ insulin pump. MDD: 80 units     ondansetron 4 MG Tbdl  Commonly known as: ZOFRAN-ODT  Take 1 tablet (4 mg total) by mouth every 6 (six) hours as needed (nausea).     PRENATAL 1+1 ORAL  Take by mouth.              Indwelling Lines/Drains at time of discharge:   Lines/Drains/Airways       None                   Time spent on the discharge of patient: 35 minutes         Jose Oakley MD  Department of Hospital Medicine  [facility]

## 2023-12-15 NOTE — PROGRESS NOTES
12/15/23 0941   Non Stress Test - General   $ NST Procedure Charge Completed   Indications/Pt Reported Complaint type 1 diabetic with insulin pump   Test Duration (min) 45 min   Number of Fetuses 1   Acoustic Stimulator No   Contractions Regular  (Pt feeling them, but states no change in pattern or intensity for a few weeks. Dr Moore notified.)   Nonstress Test Baby A   Variability Minimal  (some periods of moderate variability with position changes)   Decels Early   Accels Absent   Baseline    Interpretation Baby A   Nonstress Test Interpretation Reactive   Overall Impression Reassuring   Comments Reviewed by Dr Moore. Notifed of contractions. MD states that is usual for this patient due to polyhydramnios. MD states OK for discharge. Pt has a follow up on 12/18 with Dr Moore.       NST completed while in 2118. Notified vinh Juarez RN of MD approval of NST.

## 2023-12-15 NOTE — NURSING
Insulin pump reapplied and activated by patient per Dr. Oakley's order. Patient to call nurse before administering insulin via pump to ensure correct dosages and parameters.

## 2023-12-15 NOTE — NURSING
Dexcom reading for 745 for fasting was 117. Md aware.  Also basal rate is 24 units so 1 unit per hour slowly.

## 2023-12-15 NOTE — NURSING
2042 pt transferred via wheelchair to L&D for NST at this time.    2117 Dr. Wagner notified of pts FM strip    2122 orders to keep pt on monitor for 20min longer    2138 strip reviewed by MD... orders to DC FM and send pt back to PP

## 2023-12-15 NOTE — PLAN OF CARE
Patient has insulin pump on and regulating blood sugar. Appropriate for discharge      Problem: Adult Inpatient Plan of Care  Goal: Plan of Care Review  Outcome: Met     Problem: Adult Inpatient Plan of Care  Goal: Patient-Specific Goal (Individualized)  Outcome: Met     Problem: Adult Inpatient Plan of Care  Goal: Absence of Hospital-Acquired Illness or Injury  Outcome: Met     Problem: Adult Inpatient Plan of Care  Goal: Optimal Comfort and Wellbeing  Outcome: Met     Problem: Adult Inpatient Plan of Care  Goal: Readiness for Transition of Care  Outcome: Met     Problem: Diabetes Comorbidity  Goal: Blood Glucose Level Within Targeted Range  Outcome: Met

## 2023-12-15 NOTE — PROGRESS NOTES
OB progress note -     Patient is without complaints.  She reports good fetal movement, occasional contractions, no rupture of membranes and no vaginal bleeding.     VSS, AF  Gen - NAD  Abdomen - soft, gravid, non tender  Ext - compression hose on, no calf tenderness    A/p - IUP at 31.1 with IDDM    Sugars continue to trend down.  Patient re-set her pump last night with her new insulin requirements.  I would recommend discharge home today.  Yana is very comfortable with the new settings on her pump and counting carbs to know how much to bolus for meals based on her new insulin requirements.  She will record postprandial sugars and bring log to her visit with me on Monday.    BP is stable, urine protein negative for preeclampsia    NST prior to discharge.

## 2023-12-15 NOTE — HOSPITAL COURSE
Patient's blood sugars were well controlled during stay.  The night prior to discharge, patient started using her insulin pump as instructed.  Blood glucose remains well controlled on day of discharge.  Patient discharged home on December 15th.  Patient will follow up with endocrinology and maternal fetal medicine.

## 2023-12-15 NOTE — PLAN OF CARE
Patient discharged prior to CM clearance.  No discharge needs noted.  Per OB note, patient will be following up in office on Monday.         12/15/23 1131   Final Note   Assessment Type Final Discharge Note   Anticipated Discharge Disposition Home   What phone number can be called within the next 1-3 days to see how you are doing after discharge? [phone]

## 2023-12-15 NOTE — NURSING
0327- Spot check BG via patient's Dexcom, BG- 98.     0440- Pt on call light, reports Dexcom notification of BG 78 and trending down, cranberry juice given.     0447- Dexcom reporting BG 79 and stable at this time

## 2023-12-20 ENCOUNTER — TELEPHONE (OUTPATIENT)
Dept: ENDOCRINOLOGY | Facility: CLINIC | Age: 23
End: 2023-12-20
Payer: OTHER GOVERNMENT

## 2023-12-20 ENCOUNTER — PATIENT OUTREACH (OUTPATIENT)
Dept: ENDOCRINOLOGY | Facility: CLINIC | Age: 23
End: 2023-12-20
Payer: OTHER GOVERNMENT

## 2023-12-20 DIAGNOSIS — O24.013 TYPE 1 DIABETES MELLITUS DURING PREGNANCY IN THIRD TRIMESTER: Primary | ICD-10-CM

## 2023-12-20 PROCEDURE — 95251 PR GLUCOSE MONITOR, 72 HOUR, PHYS INTERP: ICD-10-PCS | Mod: ,,, | Performed by: PHYSICIAN ASSISTANT

## 2023-12-20 PROCEDURE — 95251 CONT GLUC MNTR ANALYSIS I&R: CPT | Mod: ,,, | Performed by: PHYSICIAN ASSISTANT

## 2023-12-20 NOTE — PATIENT INSTRUCTIONS
TIR: 66%  Av  Low: <1%    Glucoses are slightly higher in the middle of the day and she is having a few pp excursions.    Pump settings:  Basal  MN: 0.7 u/hr  7 am: 0.85 u/hr  1 pm: 0.85 u/hr  Target   ISF: 10  CHO: 2.5    Change:  Target   Basal  7 am: 0.95 u/hr    
total assistance

## 2023-12-22 ENCOUNTER — PATIENT OUTREACH (OUTPATIENT)
Dept: DIABETES | Facility: CLINIC | Age: 23
End: 2023-12-22
Payer: OTHER GOVERNMENT

## 2023-12-22 ENCOUNTER — TELEPHONE (OUTPATIENT)
Dept: DIABETES | Facility: CLINIC | Age: 23
End: 2023-12-22
Payer: OTHER GOVERNMENT

## 2023-12-22 NOTE — TELEPHONE ENCOUNTER
Pregnant patient on Medtronic 630 with dexcom g7 having issues with multiple lows early thtis morning, which led to vomiting.  Pt tried to correct with orange juice and then maple syrup.  She was frustrated and scared and took pump off.    During phone calls, BG increasing... about 167 now.  So pt put pump back on.  She is very concerned and nervous.  Does not want anything to do with ER  She cannot download her Medtronic or send My chart messages    Current pump settings:  Basal  MN: 0.65 u/hr  7 am: 0.85 u/hr  1 pm: 0.85 u/hr  Target   ISF: 10  Carbs 1:2.5  AIT 2 hours    Dexcom report attached.

## 2023-12-22 NOTE — PATIENT INSTRUCTIONS
Called pt with recs from Samantha Pandey, YONI, NP regarding pump settings for Medtronic pump.  Pt is very hesitant to make the changes rec by Samantha because she feels she will be getting more insulin during the day, which will lower her BG levels and she is having too many issues with lows already.. Also pt concerned about BG target changing.  Explained why BG targets are different during pregnancy.  Pt did ask for all info regarding pump setting changes, but refused coming in for Educator to download her Medtronic pump. Pt does not share data with us and does not use My Chart.  Pt was offered appt WED in [place] or Th, Fri in [place]. Explained importance of download for better evaluation of what pt may need.  Also offered the following week but pt will be seeing DENNYS Escamilla in [place] on 1/9/24  Strongly encouraged pt to make pump setting changes.  Provided phone number for SAMUEL Bacon in [place] if further issues arise of pt would like to discuss further with someone next week since I will not be available.  Pt appreciative of assistance.

## 2023-12-22 NOTE — PATIENT INSTRUCTIONS
Pregnant T1DM calling Endo department asking for assistance.Uses Medtronic 630 pum and Dexcom g7 CGM.  Has been having lows all night long; treating with orange juice and maple syrup.  Got concerned when BG levels were not increasing, and disconnected her pump due to lows.  She has been vomiting.    Pt just called Educator back and stated that her BG was increasing; at 123.  She will reconnect her pump.  Pt does not want to go to ER due to very bad experience there in the past.  Will contact pt about her downloading her Medtronic pump.

## 2023-12-23 ENCOUNTER — TELEPHONE (OUTPATIENT)
Dept: OBSTETRICS AND GYNECOLOGY | Facility: HOSPITAL | Age: 23
End: 2023-12-23

## 2023-12-23 ENCOUNTER — HOSPITAL ENCOUNTER (INPATIENT)
Facility: HOSPITAL | Age: 23
LOS: 5 days | Discharge: HOME | End: 2023-12-28
Attending: OBSTETRICS & GYNECOLOGY | Admitting: SPECIALIST
Payer: OTHER GOVERNMENT

## 2023-12-23 DIAGNOSIS — Z3A.32 32 WEEKS GESTATION OF PREGNANCY: ICD-10-CM

## 2023-12-23 DIAGNOSIS — G43.109 MIGRAINE WITH AURA AND WITHOUT STATUS MIGRAINOSUS, NOT INTRACTABLE: ICD-10-CM

## 2023-12-23 DIAGNOSIS — O12.03 EDEMA DURING PREGNANCY IN THIRD TRIMESTER: Primary | ICD-10-CM

## 2023-12-23 DIAGNOSIS — R60.9 EDEMA: ICD-10-CM

## 2023-12-23 DIAGNOSIS — R58 BLEEDING: ICD-10-CM

## 2023-12-23 LAB
ABO + RH BLD: NORMAL
ALBUMIN SERPL BCP-MCNC: 2.9 G/DL (ref 3.5–5.2)
ALBUMIN/CREAT UR: 507.1 UG/MG (ref 0–30)
ALP SERPL-CCNC: 97 U/L (ref 55–135)
ALT SERPL W/O P-5'-P-CCNC: 11 U/L (ref 10–44)
ANION GAP SERPL CALC-SCNC: 9 MMOL/L (ref 8–16)
AST SERPL-CCNC: 16 U/L (ref 10–40)
BACTERIA #/AREA URNS HPF: ABNORMAL /HPF
BASOPHILS # BLD AUTO: 0.03 K/UL (ref 0–0.2)
BASOPHILS NFR BLD: 0.3 % (ref 0–1.9)
BILIRUB SERPL-MCNC: 0.2 MG/DL (ref 0.1–1)
BILIRUB UR QL STRIP: NEGATIVE
BLD GP AB SCN CELLS X3 SERPL QL: NORMAL
BUN SERPL-MCNC: 8 MG/DL (ref 6–20)
CALCIUM SERPL-MCNC: 9 MG/DL (ref 8.7–10.5)
CHLORIDE SERPL-SCNC: 103 MMOL/L (ref 95–110)
CLARITY UR: ABNORMAL
CO2 SERPL-SCNC: 23 MMOL/L (ref 23–29)
COLOR UR: YELLOW
CREAT SERPL-MCNC: 0.6 MG/DL (ref 0.5–1.4)
CREAT UR-MCNC: 196.6 MG/DL (ref 15–325)
CREAT UR-MCNC: 196.6 MG/DL (ref 15–325)
DIFFERENTIAL METHOD: ABNORMAL
EOSINOPHIL # BLD AUTO: 0 K/UL (ref 0–0.5)
EOSINOPHIL NFR BLD: 0.4 % (ref 0–8)
ERYTHROCYTE [DISTWIDTH] IN BLOOD BY AUTOMATED COUNT: 13.1 % (ref 11.5–14.5)
EST. GFR  (NO RACE VARIABLE): >60 ML/MIN/1.73 M^2
GLUCOSE SERPL-MCNC: 117 MG/DL (ref 70–110)
GLUCOSE SERPL-MCNC: 154 MG/DL (ref 70–110)
GLUCOSE SERPL-MCNC: 161 MG/DL (ref 70–110)
GLUCOSE UR QL STRIP: NEGATIVE
HCT VFR BLD AUTO: 32 % (ref 37–48.5)
HGB BLD-MCNC: 10.3 G/DL (ref 12–16)
HGB UR QL STRIP: ABNORMAL
HYALINE CASTS #/AREA URNS LPF: 60 /LPF
IMM GRANULOCYTES # BLD AUTO: 0.03 K/UL (ref 0–0.04)
IMM GRANULOCYTES NFR BLD AUTO: 0.3 % (ref 0–0.5)
KETONES UR QL STRIP: ABNORMAL
LEUKOCYTE ESTERASE UR QL STRIP: ABNORMAL
LYMPHOCYTES # BLD AUTO: 1.8 K/UL (ref 1–4.8)
LYMPHOCYTES NFR BLD: 17.8 % (ref 18–48)
MCH RBC QN AUTO: 25.8 PG (ref 27–31)
MCHC RBC AUTO-ENTMCNC: 32.2 G/DL (ref 32–36)
MCV RBC AUTO: 80 FL (ref 82–98)
MICROALBUMIN UR DL<=1MG/L-MCNC: 997 UG/ML
MICROALBUMIN UR DL<=1MG/L-MCNC: 997 UG/ML
MICROSCOPIC COMMENT: ABNORMAL
MONOCYTES # BLD AUTO: 0.6 K/UL (ref 0.3–1)
MONOCYTES NFR BLD: 5.9 % (ref 4–15)
NEUTROPHILS # BLD AUTO: 7.4 K/UL (ref 1.8–7.7)
NEUTROPHILS NFR BLD: 75.3 % (ref 38–73)
NITRITE UR QL STRIP: NEGATIVE
NRBC BLD-RTO: 0 /100 WBC
PH UR STRIP: 6 [PH] (ref 5–8)
PLATELET # BLD AUTO: 298 K/UL (ref 150–450)
PMV BLD AUTO: 12.9 FL (ref 9.2–12.9)
POTASSIUM SERPL-SCNC: 3.7 MMOL/L (ref 3.5–5.1)
PROT SERPL-MCNC: 5.5 G/DL (ref 6–8.4)
PROT UR QL STRIP: ABNORMAL
RBC # BLD AUTO: 4 M/UL (ref 4–5.4)
RBC #/AREA URNS HPF: 6 /HPF (ref 0–4)
SODIUM SERPL-SCNC: 135 MMOL/L (ref 136–145)
SP GR UR STRIP: 1.02 (ref 1–1.03)
SQUAMOUS #/AREA URNS HPF: 11 /HPF
URATE SERPL-MCNC: 6 MG/DL (ref 2.4–5.7)
URN SPEC COLLECT METH UR: ABNORMAL
UROBILINOGEN UR STRIP-ACNC: NEGATIVE EU/DL
WBC # BLD AUTO: 9.83 K/UL (ref 3.9–12.7)
WBC #/AREA URNS HPF: 72 /HPF (ref 0–5)

## 2023-12-23 PROCEDURE — 36415 COLL VENOUS BLD VENIPUNCTURE: CPT | Performed by: SPECIALIST

## 2023-12-23 PROCEDURE — 25000003 PHARM REV CODE 250: Performed by: SPECIALIST

## 2023-12-23 PROCEDURE — 82043 UR ALBUMIN QUANTITATIVE: CPT | Performed by: OBSTETRICS & GYNECOLOGY

## 2023-12-23 PROCEDURE — 36415 COLL VENOUS BLD VENIPUNCTURE: CPT | Performed by: OBSTETRICS & GYNECOLOGY

## 2023-12-23 PROCEDURE — 81001 URINALYSIS AUTO W/SCOPE: CPT | Performed by: SPECIALIST

## 2023-12-23 PROCEDURE — 80053 COMPREHEN METABOLIC PANEL: CPT | Performed by: SPECIALIST

## 2023-12-23 PROCEDURE — 87086 URINE CULTURE/COLONY COUNT: CPT | Performed by: SPECIALIST

## 2023-12-23 PROCEDURE — 85025 COMPLETE CBC W/AUTO DIFF WBC: CPT | Performed by: SPECIALIST

## 2023-12-23 PROCEDURE — 99211 OFF/OP EST MAY X REQ PHY/QHP: CPT | Mod: 25

## 2023-12-23 PROCEDURE — 84550 ASSAY OF BLOOD/URIC ACID: CPT | Performed by: SPECIALIST

## 2023-12-23 PROCEDURE — 86850 RBC ANTIBODY SCREEN: CPT | Performed by: SPECIALIST

## 2023-12-23 RX ORDER — CALCIUM CARBONATE 200(500)MG
1000 TABLET,CHEWABLE ORAL 4 TIMES DAILY PRN
Status: DISCONTINUED | OUTPATIENT
Start: 2023-12-23 | End: 2023-12-24

## 2023-12-23 RX ORDER — PANTOPRAZOLE SODIUM 40 MG/1
40 TABLET, DELAYED RELEASE ORAL DAILY
Status: DISCONTINUED | OUTPATIENT
Start: 2023-12-23 | End: 2023-12-29 | Stop reason: HOSPADM

## 2023-12-23 RX ORDER — CALCIUM CARBONATE 200(500)MG
1000 TABLET,CHEWABLE ORAL DAILY PRN
Status: DISCONTINUED | OUTPATIENT
Start: 2023-12-23 | End: 2023-12-23

## 2023-12-23 RX ORDER — ACETAMINOPHEN 325 MG/1
650 TABLET ORAL EVERY 6 HOURS PRN
Status: DISCONTINUED | OUTPATIENT
Start: 2023-12-23 | End: 2023-12-24

## 2023-12-23 RX ADMIN — PANTOPRAZOLE SODIUM 40 MG: 40 TABLET, DELAYED RELEASE ORAL at 08:12

## 2023-12-23 RX ADMIN — CALCIUM CARBONATE (ANTACID) CHEW TAB 500 MG 1000 MG: 500 CHEW TAB at 05:12

## 2023-12-23 NOTE — PROGRESS NOTES
Patient presented with c/o swelling all over since last week, and states she felt like it was in her face and hands when she woke up this morning, denies leaking or bleeding and states positive fetal movement.

## 2023-12-24 ENCOUNTER — ANESTHESIA (OUTPATIENT)
Dept: OBSTETRICS AND GYNECOLOGY | Facility: HOSPITAL | Age: 23
End: 2023-12-24
Payer: OTHER GOVERNMENT

## 2023-12-24 PROBLEM — Z3A.32 32 WEEKS GESTATION OF PREGNANCY: Status: ACTIVE | Noted: 2023-12-24

## 2023-12-24 PROBLEM — G43.909 MIGRAINE WITHOUT STATUS MIGRAINOSUS, NOT INTRACTABLE: Status: ACTIVE | Noted: 2023-12-24

## 2023-12-24 LAB
ALBUMIN SERPL BCP-MCNC: 2.7 G/DL (ref 3.5–5.2)
ALP SERPL-CCNC: 103 U/L (ref 55–135)
ALT SERPL W/O P-5'-P-CCNC: 10 U/L (ref 10–44)
AMPHET+METHAMPHET UR QL: NEGATIVE
ANION GAP SERPL CALC-SCNC: 7 MMOL/L (ref 8–16)
AST SERPL-CCNC: 14 U/L (ref 10–40)
BARBITURATES UR QL SCN>200 NG/ML: NEGATIVE
BASOPHILS # BLD AUTO: 0.02 K/UL (ref 0–0.2)
BASOPHILS NFR BLD: 0.2 % (ref 0–1.9)
BENZODIAZ UR QL SCN>200 NG/ML: NEGATIVE
BILIRUB SERPL-MCNC: 0.3 MG/DL (ref 0.1–1)
BUN SERPL-MCNC: 10 MG/DL (ref 6–20)
BUPRENORPHINE UR QL: NEGATIVE
BZE UR QL SCN: NEGATIVE
CALCIUM SERPL-MCNC: 8.5 MG/DL (ref 8.7–10.5)
CANNABINOIDS UR QL SCN: NEGATIVE
CHLORIDE SERPL-SCNC: 103 MMOL/L (ref 95–110)
CO2 SERPL-SCNC: 25 MMOL/L (ref 23–29)
CREAT SERPL-MCNC: 0.6 MG/DL (ref 0.5–1.4)
CREAT UR-MCNC: 150.1 MG/DL (ref 15–325)
CREAT UR-MCNC: 150.1 MG/DL (ref 15–325)
DIFFERENTIAL METHOD: ABNORMAL
EOSINOPHIL # BLD AUTO: 0.1 K/UL (ref 0–0.5)
EOSINOPHIL NFR BLD: 0.6 % (ref 0–8)
ERYTHROCYTE [DISTWIDTH] IN BLOOD BY AUTOMATED COUNT: 13 % (ref 11.5–14.5)
EST. GFR  (NO RACE VARIABLE): >60 ML/MIN/1.73 M^2
GLUCOSE SERPL-MCNC: 150 MG/DL (ref 70–110)
GLUCOSE SERPL-MCNC: 157 MG/DL (ref 70–110)
GLUCOSE SERPL-MCNC: 165 MG/DL (ref 70–110)
GLUCOSE SERPL-MCNC: 174 MG/DL (ref 70–110)
GLUCOSE SERPL-MCNC: 189 MG/DL (ref 70–110)
GLUCOSE SERPL-MCNC: 227 MG/DL (ref 70–110)
GLUCOSE SERPL-MCNC: 228 MG/DL (ref 70–110)
HCT VFR BLD AUTO: 29.9 % (ref 37–48.5)
HGB BLD-MCNC: 9.8 G/DL (ref 12–16)
IMM GRANULOCYTES # BLD AUTO: 0.04 K/UL (ref 0–0.04)
IMM GRANULOCYTES NFR BLD AUTO: 0.4 % (ref 0–0.5)
LYMPHOCYTES # BLD AUTO: 2.4 K/UL (ref 1–4.8)
LYMPHOCYTES NFR BLD: 23.4 % (ref 18–48)
MAGNESIUM SERPL-MCNC: 2.3 MG/DL (ref 1.6–2.6)
MCH RBC QN AUTO: 26.4 PG (ref 27–31)
MCHC RBC AUTO-ENTMCNC: 32.8 G/DL (ref 32–36)
MCV RBC AUTO: 81 FL (ref 82–98)
MONOCYTES # BLD AUTO: 0.7 K/UL (ref 0.3–1)
MONOCYTES NFR BLD: 6.4 % (ref 4–15)
NEUTROPHILS # BLD AUTO: 7 K/UL (ref 1.8–7.7)
NEUTROPHILS NFR BLD: 69 % (ref 38–73)
NRBC BLD-RTO: 0 /100 WBC
OPIATES UR QL SCN: NEGATIVE
PCP UR QL SCN>25 NG/ML: NEGATIVE
PLATELET # BLD AUTO: 263 K/UL (ref 150–450)
PMV BLD AUTO: 12.1 FL (ref 9.2–12.9)
POC GLU MONITORING DEVICE: 170
POTASSIUM SERPL-SCNC: 3.8 MMOL/L (ref 3.5–5.1)
PROT SERPL-MCNC: 5.3 G/DL (ref 6–8.4)
RBC # BLD AUTO: 3.71 M/UL (ref 4–5.4)
SODIUM SERPL-SCNC: 135 MMOL/L (ref 136–145)
TOXICOLOGY INFORMATION: NORMAL
WBC # BLD AUTO: 10.08 K/UL (ref 3.9–12.7)

## 2023-12-24 PROCEDURE — 99204 OFFICE O/P NEW MOD 45 MIN: CPT | Mod: 95,,, | Performed by: PSYCHIATRY & NEUROLOGY

## 2023-12-24 PROCEDURE — C1751 CATH, INF, PER/CENT/MIDLINE: HCPCS | Performed by: ANESTHESIOLOGY

## 2023-12-24 PROCEDURE — 27000670 HC SET COMBINED SPINAL AND EPIDURAL: Performed by: ANESTHESIOLOGY

## 2023-12-24 PROCEDURE — C9290 INJ, BUPIVACAINE LIPOSOME: HCPCS | Performed by: SPECIALIST

## 2023-12-24 PROCEDURE — 80348 DRUG SCREENING BUPRENORPHINE: CPT | Performed by: SPECIALIST

## 2023-12-24 PROCEDURE — 25000003 PHARM REV CODE 250: Performed by: SPECIALIST

## 2023-12-24 PROCEDURE — 63600175 PHARM REV CODE 636 W HCPCS: Performed by: NURSE ANESTHETIST, CERTIFIED REGISTERED

## 2023-12-24 PROCEDURE — 71000039 HC RECOVERY, EACH ADD'L HOUR: Performed by: SPECIALIST

## 2023-12-24 PROCEDURE — 83735 ASSAY OF MAGNESIUM: CPT | Performed by: SPECIALIST

## 2023-12-24 PROCEDURE — 85025 COMPLETE CBC W/AUTO DIFF WBC: CPT | Performed by: SPECIALIST

## 2023-12-24 PROCEDURE — 99204 PR OFFICE/OUTPT VISIT, NEW, LEVL IV, 45-59 MIN: ICD-10-PCS | Mod: 95,,, | Performed by: PSYCHIATRY & NEUROLOGY

## 2023-12-24 PROCEDURE — 59514 CESAREAN DELIVERY ONLY: CPT | Mod: QY,ANES,, | Performed by: ANESTHESIOLOGY

## 2023-12-24 PROCEDURE — 71000033 HC RECOVERY, INTIAL HOUR: Performed by: SPECIALIST

## 2023-12-24 PROCEDURE — 63600175 PHARM REV CODE 636 W HCPCS: Performed by: SPECIALIST

## 2023-12-24 PROCEDURE — 37000009 HC ANESTHESIA EA ADD 15 MINS: Performed by: SPECIALIST

## 2023-12-24 PROCEDURE — 20000000 HC ICU ROOM

## 2023-12-24 PROCEDURE — 36415 COLL VENOUS BLD VENIPUNCTURE: CPT | Performed by: SPECIALIST

## 2023-12-24 PROCEDURE — 59514 CESAREAN DELIVERY ONLY: CPT | Mod: QX,CRNA,, | Performed by: NURSE ANESTHETIST, CERTIFIED REGISTERED

## 2023-12-24 PROCEDURE — 62326 NJX INTERLAMINAR LMBR/SAC: CPT | Performed by: ANESTHESIOLOGY

## 2023-12-24 PROCEDURE — 36000685 HC CESAREAN SECTION LEVEL I

## 2023-12-24 PROCEDURE — 59514 PRA REAN DELIVERY ONLY: ICD-10-PCS | Mod: QX,CRNA,, | Performed by: NURSE ANESTHETIST, CERTIFIED REGISTERED

## 2023-12-24 PROCEDURE — 37000008 HC ANESTHESIA 1ST 15 MINUTES: Performed by: SPECIALIST

## 2023-12-24 PROCEDURE — 59514 PRA REAN DELIVERY ONLY: ICD-10-PCS | Mod: QY,ANES,, | Performed by: ANESTHESIOLOGY

## 2023-12-24 PROCEDURE — 80307 DRUG TEST PRSMV CHEM ANLYZR: CPT | Performed by: SPECIALIST

## 2023-12-24 PROCEDURE — 12000002 HC ACUTE/MED SURGE SEMI-PRIVATE ROOM

## 2023-12-24 PROCEDURE — 80053 COMPREHEN METABOLIC PANEL: CPT | Performed by: SPECIALIST

## 2023-12-24 PROCEDURE — 25000003 PHARM REV CODE 250: Performed by: NURSE ANESTHETIST, CERTIFIED REGISTERED

## 2023-12-24 RX ORDER — AMOXICILLIN 250 MG
1 CAPSULE ORAL 2 TIMES DAILY PRN
Status: DISCONTINUED | OUTPATIENT
Start: 2023-12-24 | End: 2023-12-29 | Stop reason: HOSPADM

## 2023-12-24 RX ORDER — HYDROMORPHONE HYDROCHLORIDE 1 MG/ML
1.5 INJECTION, SOLUTION INTRAMUSCULAR; INTRAVENOUS; SUBCUTANEOUS EVERY 4 HOURS PRN
Status: DISCONTINUED | OUTPATIENT
Start: 2023-12-24 | End: 2023-12-29 | Stop reason: HOSPADM

## 2023-12-24 RX ORDER — SODIUM CHLORIDE, SODIUM LACTATE, POTASSIUM CHLORIDE, CALCIUM CHLORIDE 600; 310; 30; 20 MG/100ML; MG/100ML; MG/100ML; MG/100ML
INJECTION, SOLUTION INTRAVENOUS CONTINUOUS
Status: DISCONTINUED | OUTPATIENT
Start: 2023-12-24 | End: 2023-12-25

## 2023-12-24 RX ORDER — MAGNESIUM SULFATE HEPTAHYDRATE 40 MG/ML
4 INJECTION, SOLUTION INTRAVENOUS ONCE
Status: COMPLETED | OUTPATIENT
Start: 2023-12-24 | End: 2023-12-24

## 2023-12-24 RX ORDER — CALCIUM GLUCONATE 98 MG/ML
1 INJECTION, SOLUTION INTRAVENOUS
Status: DISCONTINUED | OUTPATIENT
Start: 2023-12-24 | End: 2023-12-29 | Stop reason: HOSPADM

## 2023-12-24 RX ORDER — ADHESIVE BANDAGE
30 BANDAGE TOPICAL 2 TIMES DAILY PRN
Status: DISCONTINUED | OUTPATIENT
Start: 2023-12-25 | End: 2023-12-25

## 2023-12-24 RX ORDER — PROCHLORPERAZINE EDISYLATE 5 MG/ML
5 INJECTION INTRAMUSCULAR; INTRAVENOUS EVERY 6 HOURS PRN
Status: DISCONTINUED | OUTPATIENT
Start: 2023-12-24 | End: 2023-12-29 | Stop reason: HOSPADM

## 2023-12-24 RX ORDER — OXYTOCIN-SODIUM CHLORIDE 0.9% IV SOLN 30 UNIT/500ML 30-0.9/5 UT/ML-%
SOLUTION INTRAVENOUS
Status: DISCONTINUED | OUTPATIENT
Start: 2023-12-24 | End: 2023-12-24

## 2023-12-24 RX ORDER — BISACODYL 10 MG
10 SUPPOSITORY, RECTAL RECTAL ONCE AS NEEDED
Status: DISCONTINUED | OUTPATIENT
Start: 2023-12-24 | End: 2023-12-29 | Stop reason: HOSPADM

## 2023-12-24 RX ORDER — FENTANYL CITRATE 50 UG/ML
INJECTION, SOLUTION INTRAMUSCULAR; INTRAVENOUS
Status: DISCONTINUED | OUTPATIENT
Start: 2023-12-24 | End: 2023-12-24

## 2023-12-24 RX ORDER — TRANEXAMIC ACID 10 MG/ML
1000 INJECTION, SOLUTION INTRAVENOUS EVERY 30 MIN PRN
Status: DISCONTINUED | OUTPATIENT
Start: 2023-12-24 | End: 2023-12-29 | Stop reason: HOSPADM

## 2023-12-24 RX ORDER — MISOPROSTOL 200 UG/1
800 TABLET ORAL
Status: DISCONTINUED | OUTPATIENT
Start: 2023-12-24 | End: 2023-12-29 | Stop reason: HOSPADM

## 2023-12-24 RX ORDER — SODIUM CHLORIDE 9 MG/ML
INJECTION, SOLUTION INTRAVENOUS CONTINUOUS
Status: DISCONTINUED | OUTPATIENT
Start: 2023-12-24 | End: 2023-12-25

## 2023-12-24 RX ORDER — OXYTOCIN/RINGER'S LACTATE 30/500 ML
334 PLASTIC BAG, INJECTION (ML) INTRAVENOUS ONCE
Status: DISCONTINUED | OUTPATIENT
Start: 2023-12-24 | End: 2023-12-29 | Stop reason: HOSPADM

## 2023-12-24 RX ORDER — MAGNESIUM SULFATE HEPTAHYDRATE 40 MG/ML
1 INJECTION, SOLUTION INTRAVENOUS CONTINUOUS
Status: DISCONTINUED | OUTPATIENT
Start: 2023-12-24 | End: 2023-12-25

## 2023-12-24 RX ORDER — OXYCODONE AND ACETAMINOPHEN 10; 325 MG/1; MG/1
1 TABLET ORAL EVERY 4 HOURS PRN
Status: DISCONTINUED | OUTPATIENT
Start: 2023-12-24 | End: 2023-12-29 | Stop reason: HOSPADM

## 2023-12-24 RX ORDER — LIDOCAINE HYDROCHLORIDE 20 MG/ML
INJECTION, SOLUTION EPIDURAL; INFILTRATION; INTRACAUDAL; PERINEURAL
Status: DISCONTINUED | OUTPATIENT
Start: 2023-12-24 | End: 2023-12-24

## 2023-12-24 RX ORDER — CEFAZOLIN SODIUM 2 G/50ML
2 SOLUTION INTRAVENOUS ONCE
Status: COMPLETED | OUTPATIENT
Start: 2023-12-24 | End: 2023-12-24

## 2023-12-24 RX ORDER — CARBOPROST TROMETHAMINE 250 UG/ML
250 INJECTION, SOLUTION INTRAMUSCULAR
Status: DISCONTINUED | OUTPATIENT
Start: 2023-12-24 | End: 2023-12-29 | Stop reason: HOSPADM

## 2023-12-24 RX ORDER — ONDANSETRON HYDROCHLORIDE 2 MG/ML
INJECTION, SOLUTION INTRAMUSCULAR; INTRAVENOUS
Status: DISCONTINUED | OUTPATIENT
Start: 2023-12-24 | End: 2023-12-24

## 2023-12-24 RX ORDER — DIPHENHYDRAMINE HCL 25 MG
25 CAPSULE ORAL EVERY 4 HOURS PRN
Status: DISCONTINUED | OUTPATIENT
Start: 2023-12-24 | End: 2023-12-29 | Stop reason: HOSPADM

## 2023-12-24 RX ORDER — PRENATAL WITH FERROUS FUM AND FOLIC ACID 3080; 920; 120; 400; 22; 1.84; 3; 20; 10; 1; 12; 200; 27; 25; 2 [IU]/1; [IU]/1; MG/1; [IU]/1; MG/1; MG/1; MG/1; MG/1; MG/1; MG/1; UG/1; MG/1; MG/1; MG/1; MG/1
1 TABLET ORAL DAILY
Status: DISCONTINUED | OUTPATIENT
Start: 2023-12-25 | End: 2023-12-29 | Stop reason: HOSPADM

## 2023-12-24 RX ORDER — DIPHENOXYLATE HYDROCHLORIDE AND ATROPINE SULFATE 2.5; .025 MG/1; MG/1
2 TABLET ORAL EVERY 6 HOURS PRN
Status: DISCONTINUED | OUTPATIENT
Start: 2023-12-24 | End: 2023-12-29 | Stop reason: HOSPADM

## 2023-12-24 RX ORDER — BUPIVACAINE HYDROCHLORIDE 5 MG/ML
24 INJECTION, SOLUTION EPIDURAL; INTRACAUDAL ONCE
Status: COMPLETED | OUTPATIENT
Start: 2023-12-24 | End: 2023-12-24

## 2023-12-24 RX ORDER — OXYCODONE HYDROCHLORIDE 5 MG/1
10 TABLET ORAL EVERY 4 HOURS PRN
Status: DISCONTINUED | OUTPATIENT
Start: 2023-12-24 | End: 2023-12-25

## 2023-12-24 RX ORDER — METHYLERGONOVINE MALEATE 0.2 MG/ML
200 INJECTION INTRAVENOUS ONCE AS NEEDED
Status: DISCONTINUED | OUTPATIENT
Start: 2023-12-24 | End: 2023-12-29 | Stop reason: HOSPADM

## 2023-12-24 RX ORDER — OXYTOCIN/RINGER'S LACTATE 30/500 ML
95 PLASTIC BAG, INJECTION (ML) INTRAVENOUS ONCE AS NEEDED
Status: DISCONTINUED | OUTPATIENT
Start: 2023-12-24 | End: 2023-12-29 | Stop reason: HOSPADM

## 2023-12-24 RX ORDER — MAGNESIUM SULFATE 1 G/100ML
1 INJECTION INTRAVENOUS ONCE
Status: DISCONTINUED | OUTPATIENT
Start: 2023-12-24 | End: 2023-12-27

## 2023-12-24 RX ORDER — MAGNESIUM SULFATE HEPTAHYDRATE 40 MG/ML
2 INJECTION, SOLUTION INTRAVENOUS CONTINUOUS
Status: DISCONTINUED | OUTPATIENT
Start: 2023-12-24 | End: 2023-12-24

## 2023-12-24 RX ORDER — MORPHINE SULFATE 0.5 MG/ML
INJECTION, SOLUTION EPIDURAL; INTRATHECAL; INTRAVENOUS
Status: DISCONTINUED | OUTPATIENT
Start: 2023-12-24 | End: 2023-12-24

## 2023-12-24 RX ORDER — MISOPROSTOL 200 UG/1
800 TABLET ORAL ONCE AS NEEDED
Status: DISCONTINUED | OUTPATIENT
Start: 2023-12-24 | End: 2023-12-29 | Stop reason: HOSPADM

## 2023-12-24 RX ORDER — MUPIROCIN 20 MG/G
OINTMENT TOPICAL 2 TIMES DAILY
Status: DISCONTINUED | OUTPATIENT
Start: 2023-12-24 | End: 2023-12-27

## 2023-12-24 RX ORDER — DIPHENHYDRAMINE HYDROCHLORIDE 50 MG/ML
25 INJECTION INTRAMUSCULAR; INTRAVENOUS EVERY 4 HOURS PRN
Status: DISCONTINUED | OUTPATIENT
Start: 2023-12-24 | End: 2023-12-24

## 2023-12-24 RX ORDER — OXYCODONE AND ACETAMINOPHEN 5; 325 MG/1; MG/1
1 TABLET ORAL EVERY 4 HOURS PRN
Status: DISCONTINUED | OUTPATIENT
Start: 2023-12-24 | End: 2023-12-29 | Stop reason: HOSPADM

## 2023-12-24 RX ORDER — ACETAMINOPHEN 10 MG/ML
INJECTION, SOLUTION INTRAVENOUS
Status: DISCONTINUED | OUTPATIENT
Start: 2023-12-24 | End: 2023-12-24

## 2023-12-24 RX ORDER — ACETAMINOPHEN 10 MG/ML
1000 INJECTION, SOLUTION INTRAVENOUS ONCE
Status: DISCONTINUED | OUTPATIENT
Start: 2023-12-24 | End: 2023-12-24

## 2023-12-24 RX ORDER — OXYTOCIN/RINGER'S LACTATE 30/500 ML
334 PLASTIC BAG, INJECTION (ML) INTRAVENOUS ONCE AS NEEDED
Status: DISCONTINUED | OUTPATIENT
Start: 2023-12-24 | End: 2023-12-29 | Stop reason: HOSPADM

## 2023-12-24 RX ORDER — MUPIROCIN 20 MG/G
OINTMENT TOPICAL
Status: DISCONTINUED | OUTPATIENT
Start: 2023-12-24 | End: 2023-12-24 | Stop reason: HOSPADM

## 2023-12-24 RX ORDER — METHYLERGONOVINE MALEATE 0.2 MG/ML
200 INJECTION INTRAVENOUS
Status: DISCONTINUED | OUTPATIENT
Start: 2023-12-24 | End: 2023-12-25

## 2023-12-24 RX ORDER — OXYTOCIN 10 [USP'U]/ML
10 INJECTION, SOLUTION INTRAMUSCULAR; INTRAVENOUS ONCE AS NEEDED
Status: DISCONTINUED | OUTPATIENT
Start: 2023-12-24 | End: 2023-12-29 | Stop reason: HOSPADM

## 2023-12-24 RX ORDER — DOCUSATE SODIUM 100 MG/1
200 CAPSULE, LIQUID FILLED ORAL 2 TIMES DAILY
Status: DISCONTINUED | OUTPATIENT
Start: 2023-12-24 | End: 2023-12-29 | Stop reason: HOSPADM

## 2023-12-24 RX ORDER — ONDANSETRON 2 MG/ML
4 INJECTION INTRAMUSCULAR; INTRAVENOUS EVERY 6 HOURS PRN
Status: DISPENSED | OUTPATIENT
Start: 2023-12-24 | End: 2023-12-26

## 2023-12-24 RX ORDER — NALBUPHINE HYDROCHLORIDE 10 MG/ML
5 INJECTION, SOLUTION INTRAMUSCULAR; INTRAVENOUS; SUBCUTANEOUS EVERY 4 HOURS PRN
Status: DISCONTINUED | OUTPATIENT
Start: 2023-12-24 | End: 2023-12-29 | Stop reason: HOSPADM

## 2023-12-24 RX ORDER — OXYTOCIN/RINGER'S LACTATE 30/500 ML
95 PLASTIC BAG, INJECTION (ML) INTRAVENOUS ONCE
Status: DISCONTINUED | OUTPATIENT
Start: 2023-12-24 | End: 2023-12-29 | Stop reason: HOSPADM

## 2023-12-24 RX ADMIN — SODIUM CHLORIDE, SODIUM LACTATE, POTASSIUM CHLORIDE, AND CALCIUM CHLORIDE: .6; .31; .03; .02 INJECTION, SOLUTION INTRAVENOUS at 06:12

## 2023-12-24 RX ADMIN — FENTANYL CITRATE 100 MCG: 50 INJECTION, SOLUTION INTRAMUSCULAR; INTRAVENOUS at 06:12

## 2023-12-24 RX ADMIN — Medication 30 UNITS: at 06:12

## 2023-12-24 RX ADMIN — MAGNESIUM SULFATE IN WATER 2 G/HR: 40 INJECTION, SOLUTION INTRAVENOUS at 04:12

## 2023-12-24 RX ADMIN — MAGNESIUM SULFATE HEPTAHYDRATE 4 G: 40 INJECTION, SOLUTION INTRAVENOUS at 03:12

## 2023-12-24 RX ADMIN — MUPIROCIN 1 G: 20 OINTMENT TOPICAL at 11:12

## 2023-12-24 RX ADMIN — CEFAZOLIN SODIUM 2 G: 2 SOLUTION INTRAVENOUS at 06:12

## 2023-12-24 RX ADMIN — SODIUM CHLORIDE, SODIUM LACTATE, POTASSIUM CHLORIDE, AND CALCIUM CHLORIDE: .6; .31; .03; .02 INJECTION, SOLUTION INTRAVENOUS at 05:12

## 2023-12-24 RX ADMIN — SODIUM CHLORIDE, SODIUM LACTATE, POTASSIUM CHLORIDE, AND CALCIUM CHLORIDE: .6; .31; .03; .02 INJECTION, SOLUTION INTRAVENOUS at 03:12

## 2023-12-24 RX ADMIN — ONDANSETRON 4 MG: 2 INJECTION INTRAMUSCULAR; INTRAVENOUS at 06:12

## 2023-12-24 RX ADMIN — LIDOCAINE HYDROCHLORIDE 4 ML: 20 INJECTION, SOLUTION EPIDURAL; INFILTRATION; INTRACAUDAL; PERINEURAL at 06:12

## 2023-12-24 RX ADMIN — IBUPROFEN 600 MG: 400 TABLET ORAL at 11:12

## 2023-12-24 RX ADMIN — PANTOPRAZOLE SODIUM 40 MG: 40 TABLET, DELAYED RELEASE ORAL at 07:12

## 2023-12-24 RX ADMIN — LIDOCAINE HYDROCHLORIDE 10 ML: 20 INJECTION, SOLUTION EPIDURAL; INFILTRATION; INTRACAUDAL; PERINEURAL at 06:12

## 2023-12-24 RX ADMIN — MAGNESIUM SULFATE IN WATER 1 G/HR: 40 INJECTION, SOLUTION INTRAVENOUS at 08:12

## 2023-12-24 RX ADMIN — MORPHINE SULFATE 2.5 MG: 0.5 INJECTION, SOLUTION EPIDURAL; INTRATHECAL; INTRAVENOUS at 07:12

## 2023-12-24 RX ADMIN — ACETAMINOPHEN 1000 MG: 10 INJECTION, SOLUTION INTRAVENOUS at 06:12

## 2023-12-24 RX ADMIN — MAGNESIUM SULFATE IN WATER 1 G/HR: 40 INJECTION, SOLUTION INTRAVENOUS at 11:12

## 2023-12-24 RX ADMIN — DOCUSATE SODIUM 200 MG: 100 CAPSULE, LIQUID FILLED ORAL at 11:12

## 2023-12-24 NOTE — SUBJECTIVE & OBJECTIVE
HPI:  23 y.o. female 32 wweeks pregnant with migraine.  Had expressive language disturbance and B hand and L face numbness.  She has had visual aura today and in the past and also had LUE numbness as an aura in the past. Symptoms have resolved.       Images personally reviewed and interpreted:  Study: Not done at time of exam      Assessment and plan:  Migraine with aura.  She has pre-eclampsia but I do not feel strongly that she needs brain imaging as symptoms resolved and were associated with typical migrainous symptoms.  Magnesium should help.    Lytics recommendation: Thrombolytic therapy not recommended due to Suspected stroke mimic  and Patient back to neurological baseline  Thrombectomy recommendation: No; no significant neurologic deficit (NIHSS <6)  and No; at this time symptoms not suggestive of large vessel occlusion  Placement recommendation: [place] [facility] [place]

## 2023-12-24 NOTE — ASSESSMENT & PLAN NOTE
Evaluated by tele-stroke, numbness/dysarthria may be 2/2 migraine   -defer further imaging for now

## 2023-12-24 NOTE — NURSING
Cbg 189. Pt did insulin correction on home insulin pump, dexcom. Pt reports giving right over 2 units.

## 2023-12-24 NOTE — PROGRESS NOTES
[facility]  Obstetrics  Antepartum Progress Note    Patient Name: Yana Richards  MRN: [id number]  Admission Date: 12/23/2023  Hospital Length of Stay: 1 days  Attending Physician: Ann Moore MD  Primary Care Provider: Michelle Doherty APRN-CNP    Subjective:     Principal Problem:Edema during pregnancy in third trimester    HPI:  No notes on file    Hospital Course:  No notes on file    Obstetric HPI:  Patient states active fetal movement, denies neurologic complaints.  Was able to rest well overnight.     Objective:     Vital Signs (Most Recent):  Temp: 98.4 °F (36.9 °C) (12/24/23 0715)  Pulse: 97 (12/24/23 0722)  Resp: 18 (12/24/23 0715)  BP: (!) 140/70 (12/24/23 0715)  SpO2: 99 % (12/24/23 0722) Vital Signs (24h Range):  Temp:  [98.2 °F (36.8 °C)-98.7 °F (37.1 °C)] 98.4 °F (36.9 °C)  Pulse:  [] 97  Resp:  [18-22] 18  SpO2:  [95 %-99 %] 99 %  BP: (118-167)/(68-95) 140/70     Weight: 98.9 kg (218 lb)  Body mass index is 39.87 kg/m².    FHT:  Baseline 130s with good variability   TOCO:  Occasional contraction, not feeling      Intake/Output Summary (Last 24 hours) at 12/24/2023 0845  Last data filed at 12/24/2023 0750  Gross per 24 hour   Intake --   Output 200 ml   Net -200 ml       Cervical Exam:  Deferred     Significant Labs:  Recent Lab Results  (Last 5 results in the past 24 hours)        12/24/23  0741   12/24/23  0705   12/24/23  0357   12/24/23  0148   12/23/23  2246        Albumin   2.7             ALP   103             ALT   10             Anion Gap   7             AST   14             Baso #   0.02             Basophil %   0.2             BILIRUBIN TOTAL   0.3  Comment: For infants and newborns, interpretation of results should be based  on gestational age, weight and in agreement with clinical  observations.    Premature Infant recommended reference ranges:  Up to 24 hours.............<8.0 mg/dL  Up to 48 hours............<12.0 mg/dL  3-5 days..................<15.0 mg/dL  6-29  days.................<15.0 mg/dL               BUN   10             Calcium   8.5             Chloride   103             CO2   25             Creatinine   0.6             Creatinine, Urine               Differential Method   Automated             eGFR   >60.0             Eos #   0.1             Eosinophil %   0.6             Glucose   157             Gran # (ANC)   7.0             Gran %   69.0             Hematocrit   29.9             Hemoglobin   9.8             Immature Grans (Abs)   0.04  Comment: Mild elevation in immature granulocytes is non specific and   can be seen in a variety of conditions including stress response,   acute inflammation, trauma and pregnancy. Correlation with other   laboratory and clinical findings is essential.               Immature Granulocytes   0.4             Lymph #   2.4             Lymph %   23.4             MCH   26.4             MCHC   32.8             MCV   81             MICROALB/CREAT RATIO               Urine Microalbumin               Mono #   0.7             Mono %   6.4             MPV   12.1             nRBC   0             Platelet Count   263             POC Glucose 165     174   189   161       Potassium   3.8             PROTEIN TOTAL   5.3             RBC   3.71             RDW   13.0             Sodium   135             WBC   10.08                                    Physical Exam  Unchanged from yesterday  Lungs clear with pulse ox in normal range    Review of Systems  Assessment/Plan:     23 y.o. female  at 32w3d for:    * Edema during pregnancy in third trimester  IUP at 32 weeks and 3 days gestational age -reassuring fetal heart tones    Type 1 diabetes-good control recently with insulin pump    Lower extremity edema-no evidence of DVT, DTRs normal range    Labile blood pressure-no neuro complaints, 24 hour urine in progress will be completed at 9:00 p.m. [facility].  Repeat LFTs and platelets normal range.  Patient had 24 hour urine completed  recently-2023 equaled 290 mg.  Discussed with patient my desire to keep her until 24 hour urine completed secondary to high-risk pregnancy, patient states understanding.    Ultrasound on admit-vertex position, ERIN 26, 2803 g estimated fetal weight,    Patient is scheduled for elective  on 2024 with primary OB, Dr. Oscar Connolly MD  Obstetrics  [facility]

## 2023-12-24 NOTE — SUBJECTIVE & OBJECTIVE
Obstetric HPI:  23-year-old  1 para 0 at 32 weeks and 2 days gestational age with type 1 diabetes presents to Labor and delivery complaining of extreme lower extremity edema.  Patient states has worsened over the last week.  Patient denies any neurologic complaints currently, states active fetal movement.  States blood sugars been well controlled currently is on insulin pump      OB History    Para Term  AB Living   1 0 0 0 0 0   SAB IAB Ectopic Multiple Live Births   0 0 0 0 0      # Outcome Date GA Lbr Ronny/2nd Weight Sex Delivery Anes PTL Lv   1 Current              Past Medical History:   Diagnosis Date    Diabetes mellitus type I      Past Surgical History:   Procedure Laterality Date    main calf muscle removal  2013    TYMPANOSTOMY TUBE PLACEMENT Bilateral 2006       PTA Medications   Medication Sig    CONTOUR NEXT TEST STRIPS Strp Use four times daily.    DEXCOM G7 SENSOR Karly Change every 10 days.    glucagon (BAQSIMI) 3 mg/actuation Spry Spray 3 mg into one nostril. Repeat in opposite nostril if no response in 15 min.    insulin (LANTUS SOLOSTAR U-100 INSULIN) glargine 100 units/mL SubQ pen Inject 25 units into the skin daily in case of pump malfunction.    insulin lispro 100 unit/mL injection Use w/ insulin pump. MDD: 80 units    ondansetron (ZOFRAN-ODT) 4 MG TbDL Take 1 tablet (4 mg total) by mouth every 6 (six) hours as needed (nausea).    prenatal vit/iron fum/folic ac (PRENATAL 1+1 ORAL) Take by mouth.       Review of patient's allergies indicates:   Allergen Reactions    Vancomycin analogues Anaphylaxis        Family History       Problem Relation (Age of Onset)    Miscarriages / Stillbirths Mother    No Known Problems Father          Tobacco Use    Smoking status: Former     Types: Cigarettes    Smokeless tobacco: Never   Substance and Sexual Activity    Alcohol use: Not Currently     Alcohol/week: 1.0 standard drink of alcohol     Types: 1 Glasses of wine per week     Drug use: Never    Sexual activity: Yes     Review of Systems   Objective:     Vital Signs (Most Recent):  Temp: 98.3 °F (36.8 °C) (12/23/23 1938)  Pulse: 109 (12/23/23 2000)  Resp: (!) 22 (12/23/23 2000)  BP: (!) 141/89 (12/23/23 2000)  SpO2: 98 % (12/23/23 1938) Vital Signs (24h Range):  Temp:  [98.3 °F (36.8 °C)-98.7 °F (37.1 °C)] 98.3 °F (36.8 °C)  Pulse:  [] 109  Resp:  [18-22] 22  SpO2:  [95 %-98 %] 98 %  BP: (121-167)/(68-95) 141/89     Weight: 98.9 kg (218 lb)  Body mass index is 39.87 kg/m².    FHT:  Baseline 130s with good variability  TOCO:  Occasional contractions, patient not feeling     Physical Exam  General-no apparent distress resting comfortably in bed   Abdomen/uterus-gravid nontender with mild edema to the lower abdomen  Extremities-significant pitting lower extremity edema bilaterally.  No erythema or Homans sign, no evidence of DVT.  DTRs normal range     Cervix:  Deferred     Significant Labs:  Lab Results   Component Value Date    [facility] B POS 12/23/2023       CBC:   Recent Labs   Lab 12/23/23  1730   WBC 9.83   RBC 4.00   HGB 10.3*   HCT 32.0*      MCV 80*   MCH 25.8*   MCHC 32.2     CMP:   Recent Labs   Lab 12/23/23  1730   *   CALCIUM 9.0   ALBUMIN 2.9*   PROT 5.5*   *   K 3.7   CO2 23      BUN 8   CREATININE 0.6   ALKPHOS 97   ALT 11   AST 16   BILITOT 0.2     Recent Labs   Lab 12/23/23  1710   COLORU Yellow   SPECGRAV 1.020   PHUR 6.0   PROTEINUA 2+*   BACTERIA Occasional   NITRITE Negative   LEUKOCYTESUR 2+*   UROBILINOGEN Negative   HYALINECASTS 60*     I have personallly reviewed all pertinent lab results from the last 24 hours.

## 2023-12-24 NOTE — H&P
[facility]  History & Physical    Patient Name: Yana Richards  MRN: [id number]  Patient Class: OP- Outpatient  Admission Date: 2023  Attending Physician: Ann Moore MD   Primary Care Provider: Michelle Doherty APRN-CNP         Patient information was obtained from patient and ER records.     Subjective:     Principal Problem:Edema during pregnancy in third trimester    Chief Complaint:   Chief Complaint   Patient presents with    Swelling     Patient c/o swelling getting worse today.        HPI: Yana Richards is a 22 yo  32w3d w/pmhx of T1DM on insulin pump who was admitted to the L&D floor w/pre-eclampsia and monitoring. Today, she began having a headache. A rapid response was then later called when she began having dysarthria and R hand numbness. By my arrival, patient's dysarthria had resolved. Her numbness had also improved. Upon my exam, CN 2-12 were grossly intact, sensation intact in all extremities, 4/5 strength in all extremities. Sugar 210. Her insulin pump had been removed briefly after it came off during changing and was off for about 3 hours. Systolic BP in the 140s. Tele-stroke was called to evaluate due to patient's risk of radiation w/CT head and recommended deferring further imaging for now as symptoms had improved. In addition, symptoms were thought to be more from migraine w/aura vs pre-eclampsia. Hospital medicine consulted for diabetes management. Patient will have  today and will be transferred to the ICU afterwards.     Past Medical History:   Diagnosis Date    Diabetes mellitus type I        Past Surgical History:   Procedure Laterality Date    main calf muscle removal  2013    TYMPANOSTOMY TUBE PLACEMENT Bilateral 2006       Review of patient's allergies indicates:   Allergen Reactions    Vancomycin analogues Anaphylaxis       No current facility-administered medications on file prior to encounter.     Current Outpatient  Medications on File Prior to Encounter   Medication Sig    CONTOUR NEXT TEST STRIPS Strp Use four times daily.    DEXCOM G7 SENSOR Karly Change every 10 days.    glucagon (BAQSIMI) 3 mg/actuation Spry Spray 3 mg into one nostril. Repeat in opposite nostril if no response in 15 min.    insulin (LANTUS SOLOSTAR U-100 INSULIN) glargine 100 units/mL SubQ pen Inject 25 units into the skin daily in case of pump malfunction.    insulin lispro 100 unit/mL injection Use w/ insulin pump. MDD: 80 units    ondansetron (ZOFRAN-ODT) 4 MG TbDL Take 1 tablet (4 mg total) by mouth every 6 (six) hours as needed (nausea).    prenatal vit/iron fum/folic ac (PRENATAL 1+1 ORAL) Take by mouth.     Family History       Problem Relation (Age of Onset)    Miscarriages / Stillbirths Mother    No Known Problems Father          Tobacco Use    Smoking status: Former     Types: Cigarettes    Smokeless tobacco: Never   Substance and Sexual Activity    Alcohol use: Not Currently     Alcohol/week: 1.0 standard drink of alcohol     Types: 1 Glasses of wine per week    Drug use: Never    Sexual activity: Yes     Review of Systems   All other systems reviewed and are negative.    Objective:     Vital Signs (Most Recent):  Temp: 98.4 °F (36.9 °C) (12/24/23 1159)  Pulse: 110 (12/24/23 1617)  Resp: 18 (12/24/23 1611)  BP: (!) 167/89 (12/24/23 1611)  SpO2: 98 % (12/24/23 1617) Vital Signs (24h Range):  Temp:  [98.2 °F (36.8 °C)-98.7 °F (37.1 °C)] 98.4 °F (36.9 °C)  Pulse:  [] 110  Resp:  [17-22] 18  SpO2:  [76 %-100 %] 98 %  BP: (118-167)/(68-95) 167/89     Weight: 98.9 kg (218 lb)  Body mass index is 39.87 kg/m².     Physical Exam  Constitutional:       Appearance: She is obese.   HENT:      Head: Normocephalic and atraumatic.      Right Ear: External ear normal.      Left Ear: External ear normal.      Nose: Nose normal.      Mouth/Throat:      Mouth: Mucous membranes are moist.   Eyes:      Extraocular Movements: Extraocular movements intact.    Cardiovascular:      Rate and Rhythm: Normal rate and regular rhythm.      Heart sounds: No murmur heard.  Pulmonary:      Effort: Pulmonary effort is normal. No respiratory distress.      Breath sounds: No wheezing.   Abdominal:      General: There is distension.      Comments: Fetal monitoring in place   Musculoskeletal:         General: Swelling present.      Right lower leg: Edema present.      Left lower leg: Edema present.      Comments: Diffuse swelling of legs bilaterally   Skin:     General: Skin is warm.   Neurological:      Mental Status: She is alert and oriented to person, place, and time.      Comments: Sensation intact in all extremities w/mild numbness in R hand  CN 2-12 grossly intact   4/5 strength in all extremities    Psychiatric:         Mood and Affect: Mood normal.                Significant Labs: All pertinent labs within the past 24 hours have been reviewed.    Significant Imaging: I have reviewed all pertinent imaging results/findings within the past 24 hours.  Assessment/Plan:     * Edema during pregnancy in third trimester  Likely 2/2 pre-eclampsia       32 weeks gestation of pregnancy  -care per ob  -patient to have  today       Migraine without status migrainosus, not intractable  Evaluated by tele-stroke, numbness/dysarthria may be 2/2 migraine   -defer further imaging for now       Type 1 diabetes mellitus with hyperglycemia  -wears insulin pump at home   -will continue home insulin pump for now and monitor sugars post-op   -will transition to insulin gtt if needed after surgery   -patient to be admitted to ICU after    -q2 accuchecks in ICU       VTE Risk Mitigation (From admission, onward)           Ordered     IP VTE HIGH RISK PATIENT  Once         23 1610     Place sequential compression device  Until discontinued         23 1610                                   Kalia Meeks MD  Department of Hospital Medicine  [facility]

## 2023-12-24 NOTE — ASSESSMENT & PLAN NOTE
IUP at 32 weeks and 3 days gestational age -reassuring fetal heart tones    Type 1 diabetes-good control recently with insulin pump    Lower extremity edema-no evidence of DVT, DTRs normal range    Labile blood pressure-no neuro complaints, [facility]  Repeat LFTs and platelets normal range.  Patient had 24 hour urine completed recently-2023 equaled 290 mg.  Discussed with patient my desire to keep her until 24 hour urine completed secondary to high-risk pregnancy, patient states understanding.    Ultrasound on admit-vertex position, ERIN 26, 2803 g estimated fetal weight,    Patient is scheduled for elective  on 2024 with primary OB, Dr. Moore

## 2023-12-24 NOTE — SUBJECTIVE & OBJECTIVE
Obstetric HPI:  Patient states active fetal movement, denies neurologic complaints.  Was able to rest well overnight.     Objective:     Vital Signs (Most Recent):  Temp: 98.4 °F (36.9 °C) (12/24/23 0715)  Pulse: 97 (12/24/23 0722)  Resp: 18 (12/24/23 0715)  BP: (!) 140/70 (12/24/23 0715)  SpO2: 99 % (12/24/23 0722) Vital Signs (24h Range):  Temp:  [98.2 °F (36.8 °C)-98.7 °F (37.1 °C)] 98.4 °F (36.9 °C)  Pulse:  [] 97  Resp:  [18-22] 18  SpO2:  [95 %-99 %] 99 %  BP: (118-167)/(68-95) 140/70     Weight: 98.9 kg (218 lb)  Body mass index is 39.87 kg/m².    FHT:  Baseline 130s with good variability   TOCO:  Occasional contraction, not feeling      Intake/Output Summary (Last 24 hours) at 12/24/2023 0845  Last data filed at 12/24/2023 0750  Gross per 24 hour   Intake --   Output 200 ml   Net -200 ml       Cervical Exam:  Deferred     Significant Labs:  Recent Lab Results  (Last 5 results in the past 24 hours)        12/24/23  0741   12/24/23  0705   12/24/23  0357   12/24/23  0148   12/23/23  2246        Albumin   2.7             ALP   103             ALT   10             Anion Gap   7             AST   14             Baso #   0.02             Basophil %   0.2             BILIRUBIN TOTAL   0.3  Comment: For infants and newborns, interpretation of results should be based  on gestational age, weight and in agreement with clinical  observations.    Premature Infant recommended reference ranges:  Up to 24 hours.............<8.0 mg/dL  Up to 48 hours............<12.0 mg/dL  3-5 days..................<15.0 mg/dL  6-29 days.................<15.0 mg/dL               BUN   10             Calcium   8.5             Chloride   103             CO2   25             Creatinine   0.6             Creatinine, Urine               Differential Method   Automated             eGFR   >60.0             Eos #   0.1             Eosinophil %   0.6             Glucose   157             Gran # (ANC)   7.0             Gran %   69.0              Hematocrit   29.9             Hemoglobin   9.8             Immature Grans (Abs)   0.04  Comment: Mild elevation in immature granulocytes is non specific and   can be seen in a variety of conditions including stress response,   acute inflammation, trauma and pregnancy. Correlation with other   laboratory and clinical findings is essential.               Immature Granulocytes   0.4             Lymph #   2.4             Lymph %   23.4             MCH   26.4             MCHC   32.8             MCV   81             MICROALB/CREAT RATIO               Urine Microalbumin               Mono #   0.7             Mono %   6.4             MPV   12.1             nRBC   0             Platelet Count   263             POC Glucose 165     174   189   161       Potassium   3.8             PROTEIN TOTAL   5.3             RBC   3.71             RDW   13.0             Sodium   135             WBC   10.08                                    Physical Exam  Unchanged from yesterday  Lungs clear with pulse ox in normal range    Review of Systems

## 2023-12-24 NOTE — HPI
Yana Richards is a 24 yo  32w3d w/pmhx of T1DM on insulin pump who was admitted to the L&D floor w/pre-eclampsia and monitoring. Today, she began having a headache. A rapid response was then later called when she began having dysarthria and R hand numbness. By my arrival, patient's dysarthria had resolved. Her numbness had also improved. Upon my exam, CN 2-12 were grossly intact, sensation intact in all extremities, 4/5 strength in all extremities. Sugar 210. Her insulin pump had been removed briefly after it came off during changing and was off for about 3 hours. Systolic BP in the 140s. Tele-stroke was called to evaluate due to patient's risk of radiation w/CT head and recommended deferring further imaging for now as symptoms had improved. In addition, symptoms were thought to be more from migraine w/aura vs pre-eclampsia. Hospital medicine consulted for diabetes management. Patient will have  today and will be transferred to the ICU afterwards.

## 2023-12-24 NOTE — PROGRESS NOTES
[facility] hospital  Obstetrics  Antepartum Progress Note    Patient Name: Yana Richards  MRN: [id number]  Admission Date: 2023  Hospital Length of Stay: 1 days  Attending Physician: Ann Moore MD  Primary Care Provider: Michelle Doherty APRN-CNP    Subjective:     Principal Problem:Edema during pregnancy in third trimester    HPI:  No notes on file    Hospital Course:  No notes on file    Obstetric HPI:  RN reports patient complaints of a severe headache, had left-sided numbness and difficulty articulating for a few minutes.  Rapid response called patient evaluated, stable now.  Patient now coherent and numbness resolving.  Tele stroke currently evaluating patient.    24 hour urine will not be completed until .  Discussed with patient, neuro symptoms possibly related to preeclampsia versus migraine type headache.  Patient with 2+ proteinuria and significant edema over a short period of time including more than 10 lb weight gain in 5 days.  Discussed with patient's primary OB, plan was for scheduled  at 36 weeks, will proceed with primary  section tonight     Objective:     Vital Signs (Most Recent):  Temp: 98.4 °F (36.9 °C) (23 1159)  Pulse: 101 (23 1159)  Resp: 17 (23 1159)  BP: (!) 140/74 (23 1159)  SpO2: 99 % (23 0722) Vital Signs (24h Range):  Temp:  [98.2 °F (36.8 °C)-98.7 °F (37.1 °C)] 98.4 °F (36.9 °C)  Pulse:  [] 101  Resp:  [17-22] 17  SpO2:  [95 %-99 %] 99 %  BP: (118-167)/(68-95) 140/74     Weight: 98.9 kg (218 lb)  Body mass index is 39.87 kg/m².    FHT:  Baseline 130s and reassuring   TOCO:  Random contraction      Intake/Output Summary (Last 24 hours) at 2023 1516  Last data filed at 2023 1248  Gross per 24 hour   Intake --   Output 400 ml   Net -400 ml       Cervical Exam:  Deferred     Significant Labs:  Recent Lab Results  (Last 5 results in the past 24 hours)        23  1329   23  1032   23  0741    12/24/23  0705   12/24/23  0357        Albumin       2.7         ALP       103         ALT       10         Anion Gap       7         AST       14         Baso #       0.02         Basophil %       0.2         BILIRUBIN TOTAL       0.3  Comment: For infants and newborns, interpretation of results should be based  on gestational age, weight and in agreement with clinical  observations.    Premature Infant recommended reference ranges:  Up to 24 hours.............<8.0 mg/dL  Up to 48 hours............<12.0 mg/dL  3-5 days..................<15.0 mg/dL  6-29 days.................<15.0 mg/dL           BUN       10         Calcium       8.5         Chloride       103         CO2       25         Creatinine       0.6         Creatinine, Urine               Differential Method       Automated         eGFR       >60.0         Eos #       0.1         Eosinophil %       0.6         Glucose       157         Gran # (ANC)       7.0         Gran %       69.0         Hematocrit       29.9         Hemoglobin       9.8         Immature Grans (Abs)       0.04  Comment: Mild elevation in immature granulocytes is non specific and   can be seen in a variety of conditions including stress response,   acute inflammation, trauma and pregnancy. Correlation with other   laboratory and clinical findings is essential.           Immature Granulocytes       0.4         Lymph #       2.4         Lymph %       23.4         MCH       26.4         MCHC       32.8         MCV       81         MICROALB/CREAT RATIO               Urine Microalbumin               Mono #       0.7         Mono %       6.4         MPV       12.1         nRBC       0         Platelet Count       263         POC Glucose Monitoring Device   170             POC Glucose 150     165     174       Potassium       3.8         PROTEIN TOTAL       5.3         RBC       3.71         RDW       13.0         Sodium       135         WBC       10.08                                 Physical Exam    Review of Systems  Assessment/Plan:     23 y.o. female  at 32w3d for:    * Edema during pregnancy in third trimester  IUP at 32 weeks and 3 days gestational age    Neuro complaints-now mostly resolved.  Tele stroke evaluated patient states no need for brain imaging at this time..  Unclear picture as to whether neuro complaints are related to preeclampsia versus migraine with aura but because patient with extreme edema, 2+ proteinuria, type 1 diabetes, and now neuro complaints will proceed with primary  section.  Discussed with patient's primary OB Dr. Moore, plan was for primary  at 36 weeks.  Discussed with patient, voices understanding.  Patient started  on magnesium sulfate and will remain so after delivery.    Type 1 diabetes-on insulin pump          No betamethasone given prior to delivery secondary to type 1 diabetes    Danitza Connolly MD  Obstetrics  [facility]

## 2023-12-24 NOTE — ASSESSMENT & PLAN NOTE
IUP at 32 weeks and 2 days gestational age     Type 1 diabetes-good control recently with insulin pump    Lower extremity edema-no evidence of DVT, DTRs normal range    Labile blood pressure-no neuro complaints, 24 hour urine in progress.  LFTs and platelets normal range.  Patient had 24 hour urine completed recently-2023 equaled 290 mg.    Ultrasound on admit-vertex position, ERIN 26, 2803 g estimated fetal weight,    Patient is scheduled for elective  on 2024 with primary OB, Dr. Moore

## 2023-12-24 NOTE — ANESTHESIA PREPROCEDURE EVALUATION
12/24/2023  Yana Richards is a 23 y.o., female.      Pre-op Assessment    I have reviewed the Patient Summary Reports.     I have reviewed the Nursing Notes. I have reviewed the NPO Status.   I have reviewed the Medications.     Review of Systems  Anesthesia Hx:             Denies Family Hx of Anesthesia complications.    Denies Personal Hx of Anesthesia complications.                    Social:  Non-Smoker, No Alcohol Use       Hematology/Oncology:  Hematology Normal   Oncology Normal                                   EENT/Dental:  EENT/Dental Normal           Cardiovascular:  Cardiovascular Normal                   Magnesium started for probable preeclampsia, on the basis of borderline hypertension (which has not required any medications), proteinuria, and development of severe edema over the past couple of weeks.  Edema is primarily from waist down but also affects her face to a lesser extent.  No oropharyngeal edema.  Lungs are clear and patient denies any shortness of breath.                         Pulmonary:  Pulmonary Normal                       Renal/:  Renal/ Normal                 Hepatic/GI:  Hepatic/GI Normal       Patient had lunch at 12:30 p.m. today          Musculoskeletal:     Recent lower back, pelvic, and leg pain due to severe edema, as well as recent numbness and tingling of her feet.            Neurological:      Headaches (longstanding history of migraines with aura, usually flashing lights and numbness of her face and/or extremities.)     This afternoon, patient experience a migraine with her usual aura but also developed an expressive language disturbance (first time ever).  A rapid response team was activated and neurology telemedicine consultation was initiated.  The neurologist feels that this new symptom is a result of her migraine.  He does not suspect stroke or seizure and  does not feel that patient needs brain imaging at this time.                            Endocrine:  Diabetes (patient has an insulin pump infusing and glucose is currently 212.  Her glucose has been running 150-170 lately.), using insulin           Psych:  Psychiatric Normal                    Physical Exam  General: Cooperative, Alert and Oriented    Airway:  Mallampati: III / II  Mouth Opening: Normal  TM Distance: > 6 cm  Tongue: Normal  Neck ROM: Normal ROM    Dental:  Intact    Chest/Lungs:  Clear to auscultation, Normal Respiratory Rate    Heart:  Rate: Normal  Rhythm: Regular Rhythm  Sounds: Normal        Anesthesia Plan  Type of Anesthesia, risks & benefits discussed:    Anesthesia Type: Epidural  Intra-op Monitoring Plan: Standard ASA Monitors  Post Op Pain Control Plan: multimodal analgesia  Informed Consent: Informed consent signed with the Patient and all parties understand the risks and agree with anesthesia plan.  All questions answered.   ASA Score: 3 Emergent  Anesthesia Plan Notes: Epidural morphine, IV acetaminophen, Zofran    Ready For Surgery From Anesthesia Perspective.     .

## 2023-12-24 NOTE — SIGNIFICANT EVENT
Rapid response called for dysarthria and R hand numbness. Patient is a 22 yo  32w3d w/pmhx of T1DM who was admitted w/pre-eclampsia and monitoring. Patient initially had a headache and patient was about to get fioricet when she began having dysarthria and R hand numbness. By my arrival, patient's dysarthria had resolved. Her numbness had also improved. Upon my exam, CN 2-12 were grossly intact, sensation intact in all extremities, 4/5 strength in all extremities. Sugar in the 200s. Systolic BP in the 140s. Tele-stroke called to evaluate due to patient's risk of radiation w/CT head. Possible patient had a stroke, TIA, versus if these were symptoms of her pre-eclampsia. Discussed w/Dr. Connolly, ob/gyn, who is considering delivering patient today. Will await further recs from tele-stroke.

## 2023-12-24 NOTE — SUBJECTIVE & OBJECTIVE
Obstetric HPI:  RN reports patient complaints of a severe headache, had left-sided numbness and difficulty articulating for a few minutes.  Rapid response called patient evaluated, stable now.  Patient now coherent and numbness resolving.  Tele stroke currently evaluating patient.    24 hour urine will not be completed until .  Discussed with patient, neuro symptoms possibly related to preeclampsia versus migraine type headache.  Patient with 2+ proteinuria and significant edema over a short period of time including more than 10 lb weight gain in 5 days.  Discussed with patient's primary OB, plan was for scheduled  at 36 weeks, will proceed with primary  section tonight     Objective:     Vital Signs (Most Recent):  Temp: 98.4 °F (36.9 °C) (23 1159)  Pulse: 101 (23 115)  Resp: 17 (23 115)  BP: (!) 140/74 (23 1159)  SpO2: 99 % (23 0722) Vital Signs (24h Range):  Temp:  [98.2 °F (36.8 °C)-98.7 °F (37.1 °C)] 98.4 °F (36.9 °C)  Pulse:  [] 101  Resp:  [17-22] 17  SpO2:  [95 %-99 %] 99 %  BP: (118-167)/(68-95) 140/74     Weight: 98.9 kg (218 lb)  Body mass index is 39.87 kg/m².    FHT:  Baseline 130s and reassuring   TOCO:  Random contraction      Intake/Output Summary (Last 24 hours) at 2023 1516  Last data filed at 2023 1248  Gross per 24 hour   Intake --   Output 400 ml   Net -400 ml       Cervical Exam:  Deferred     Significant Labs:  Recent Lab Results  (Last 5 results in the past 24 hours)        23  1329   23  1032   23  0741   23  0705   23  0357        Albumin       2.7         ALP       103         ALT       10         Anion Gap       7         AST       14         Baso #       0.02         Basophil %       0.2         BILIRUBIN TOTAL       0.3  Comment: For infants and newborns, interpretation of results should be based  on gestational age, weight and in agreement with clinical  observations.    Premature  Infant recommended reference ranges:  Up to 24 hours.............<8.0 mg/dL  Up to 48 hours............<12.0 mg/dL  3-5 days..................<15.0 mg/dL  6-29 days.................<15.0 mg/dL           BUN       10         Calcium       8.5         Chloride       103         CO2       25         Creatinine       0.6         Creatinine, Urine               Differential Method       Automated         eGFR       >60.0         Eos #       0.1         Eosinophil %       0.6         Glucose       157         Gran # (ANC)       7.0         Gran %       69.0         Hematocrit       29.9         Hemoglobin       9.8         Immature Grans (Abs)       0.04  Comment: Mild elevation in immature granulocytes is non specific and   can be seen in a variety of conditions including stress response,   acute inflammation, trauma and pregnancy. Correlation with other   laboratory and clinical findings is essential.           Immature Granulocytes       0.4         Lymph #       2.4         Lymph %       23.4         MCH       26.4         MCHC       32.8         MCV       81         MICROALB/CREAT RATIO               Urine Microalbumin               Mono #       0.7         Mono %       6.4         MPV       12.1         nRBC       0         Platelet Count       263         POC Glucose Monitoring Device   170             POC Glucose 150     165     174       Potassium       3.8         PROTEIN TOTAL       5.3         RBC       3.71         RDW       13.0         Sodium       135         WBC       10.08                                Physical Exam    Review of Systems

## 2023-12-24 NOTE — NURSING
[facility]  Department of Obstetrics and Gynecology  Labor & Delivery Triage Assessment    PATIENT NAME: Yana Richards  MRN: [id number]  TODAY'S DATE: 2023    CHIEF COMPLAINT: Swelling (Patient c/o swelling getting worse today.)      OB History    Para Term  AB Living   1 0 0 0 0 0   SAB IAB Ectopic Multiple Live Births   0 0 0 0 0      # Outcome Date GA Lbr Ronny/2nd Weight Sex Delivery Anes PTL Lv   1 Current              Past Medical History:   Diagnosis Date    Diabetes mellitus type I      Past Surgical History:   Procedure Laterality Date    main calf muscle removal  2013    TYMPANOSTOMY TUBE PLACEMENT Bilateral 2006         VITAL SIGNS - ABNORMAL VITALS INCLUDE TEMP >100.4,RR <12 or >26, SUSTAINED MATERNAL PULSE <60 or >120     VITAL SIGNS (Most Recent)  Temp: 98.7 °F (37.1 °C) (23)  Pulse: 103 (23)  Resp: 18 (23)  BP: 137/85 (23)  SpO2: 95 % (23)    VITAL SIGNS     normal  HEADACHE    no     VOMITING    no  VISUAL DISTURBANCES  no  EPIGASTRIC PAIN        no  PROTEINURIA 2+ or MORE             yes   EDEMA FACE/EXTREMITIES            yes    FETAL MOVEMENT     FETAL MOVEMENT: Active  FETAL HEART RATE BASELINE =  145  normal  FETAL HEART RATE VARIABILITY:  Moderate  FETAL HEART RATE ACCELERATIONS FOR GESTATIONAL AGE: present  FETAL HEART RATE DECELERATIONS: none    ABDOMINAL PAIN/CRAMPING/CONTRACTIONS     Patient is complaining of abdominal pain/cramping/contractions. Contraction pattern is regular, < or = 5 minutes apart. Contraction strength is mild. Resting tone is relaxed. Abdominal palpation is non-tender.    RUPTURE OF MEMBRANES OR LEAKING OF AMNIOTIC FLUID     Patient denies ROM or leaking of amniotic fluid.    VAGINAL BLEEDING     Patient denies vaginal bleeding.    VAGINAL EXAM     DILATION:    STATION:    EFFACEMENT:    PRESENTATION:      VAGINAL EXAM DEFERRED DUE TO:      No exam done, will talk to MD   PAIN  PRESENT ON ARRIVAL     ONSET:   Last week  LOCATION:  Pelvic area  PAIN SCALE (0-10):  4  DESCRIPTION: aching    Interventions     None.      PATIENT DISPOSITION     Patient on labor and delivery , having labs and Blood pressure check      DrVinny Connolly notified, after lab results,  of the above assessment.    Maribell Murphy, ANDI  [facility]  12/23/2023

## 2023-12-24 NOTE — ASSESSMENT & PLAN NOTE
-wears insulin pump at home   -will continue home insulin pump for now and monitor sugars post-op   -will transition to insulin gtt if needed after surgery   -patient to be admitted to ICU after    -q2 accuchecks in ICU

## 2023-12-24 NOTE — TELEMEDICINE CONSULT
"[facility]  Vascular Neurology  Comprehensive Stroke Center  TeleVascular Neurology Acute Consultation Note        Consult Information  Consults    Consulting Provider: BROOKLYN MONGE   Current Providers  No providers found    Patient Location:  [facility] LABOR AND DELIVERY IP Unit    Spoke hospital nurse at bedside with patient assisting consultant.  Patient information was obtained from patient.       Stroke Documentation  Acute Stroke Times   Symptom Onset Date: 12/24/23  Symptom Onset Time: 1400  Stroke Team Arrival Date: 12/24/23  Stroke Team Arrival Time: 1514    NIH Scale:  Interval: baseline  1a. Level of Consciousness: 0-->Alert, keenly responsive  1b. LOC Questions: 0-->Answers both questions correctly  1c. LOC Commands: 0-->Performs both tasks correctly  2. Best Gaze: 0-->Normal  3. Visual: 0-->No visual loss  4. Facial Palsy: 0-->Normal symmetrical movements  5a. Motor Arm, Left: 0-->No drift, limb holds 90 (or 45) degrees for full 10 secs  5b. Motor Arm, Right: 0-->No drift, limb holds 90 (or 45) degrees for full 10 secs  6a. Motor Leg, Left: 0-->No drift, leg holds 30 degree position for full 5 secs  6b. Motor Leg, Right: 0-->No drift, leg holds 30 degree position for full 5 secs  7. Limb Ataxia: 0-->Absent  8. Sensory: 0-->Normal, no sensory loss  9. Best Language: 0-->No aphasia, normal  10. Dysarthria: 0-->Normal  11. Extinction and Inattention (formerly Neglect): 0-->No abnormality  Total (NIH Stroke Scale): 0      Modified Haley:    Anu Coma Scale:     ABCD2 Score:    [id number] Score:    HAS -BLED Score:    ICH Score:    Hunt & Whyte Classification:      Blood pressure (!) 140/74, pulse 101, temperature 98.4 °F (36.9 °C), resp. rate 17, height 5' 2" (1.575 m), weight 98.9 kg (218 lb), SpO2 99 %, not currently breastfeeding.    Van Negative    Diagnoses  Problem Noted   Migraine Without Status Migrainosus, Not Intractable 12/24/2023       Medical Decision Making  HPI:  23 " y.o. female 32 wweeks pregnant with migraine.  Had expressive language disturbance and B hand and L face numbness.  She has had visual aura today and in the past and also had LUE numbness as an aura in the past. Symptoms have resolved.       Images personally reviewed and interpreted:  Study: Not done at time of exam      Assessment and plan:  Migraine with aura.  She has pre-eclampsia but I do not feel strongly that she needs brain imaging as symptoms resolved and were associated with typical migrainous symptoms.  Magnesium should help.    Lytics recommendation: Thrombolytic therapy not recommended due to Suspected stroke mimic  and Patient back to neurological baseline  Thrombectomy recommendation: No; no significant neurologic deficit (NIHSS <6)  and No; at this time symptoms not suggestive of large vessel occlusion  Placement recommendation[place] [facility]                ROS  Physical Exam  Past Medical History:   Diagnosis Date    Diabetes mellitus type I      Past Surgical History:   Procedure Laterality Date    main calf muscle removal  08/21/2013    TYMPANOSTOMY TUBE PLACEMENT Bilateral 08/09/2006     Family History   Problem Relation Age of Onset    Miscarriages / Stillbirths Mother     No Known Problems Father            Simeon iLu MD      Emergent/Acute neurological consultation requested by spoke provider due to critical concerns for possible cerebrovascular event that could result in permanent loss of neurologic/bodily function, severe disability or death of this patient.  Immediate/timely evaluation by a highly prepared expert is paramount for optimal outcomes  High risk for neurological deterioration if not properly diagnosed  High risk for neurological deterioration if not treated promplty/as soon as possible  Complex diagnostic evaluation may be required (advanced imaging)  High risk treatment options (thrombolytics and/or thrombectomy)    Patient care was coordinated with  spoke provider, including but not limted to    Discussing likely diagnosis/etiology of symptoms  Making recommendations for further diagnostic studies  Making recommendations for intravenous thrombolytics or other advanced therapies  Making recommendations for disposition (admission/transfer for higher level of care)

## 2023-12-24 NOTE — SUBJECTIVE & OBJECTIVE
Past Medical History:   Diagnosis Date    Diabetes mellitus type I        Past Surgical History:   Procedure Laterality Date    main calf muscle removal  08/21/2013    TYMPANOSTOMY TUBE PLACEMENT Bilateral 08/09/2006       Review of patient's allergies indicates:   Allergen Reactions    Vancomycin analogues Anaphylaxis       No current facility-administered medications on file prior to encounter.     Current Outpatient Medications on File Prior to Encounter   Medication Sig    CONTOUR NEXT TEST STRIPS Strp Use four times daily.    DEXCOM G7 SENSOR Karly Change every 10 days.    glucagon (BAQSIMI) 3 mg/actuation Spry Spray 3 mg into one nostril. Repeat in opposite nostril if no response in 15 min.    insulin (LANTUS SOLOSTAR U-100 INSULIN) glargine 100 units/mL SubQ pen Inject 25 units into the skin daily in case of pump malfunction.    insulin lispro 100 unit/mL injection Use w/ insulin pump. MDD: 80 units    ondansetron (ZOFRAN-ODT) 4 MG TbDL Take 1 tablet (4 mg total) by mouth every 6 (six) hours as needed (nausea).    prenatal vit/iron fum/folic ac (PRENATAL 1+1 ORAL) Take by mouth.     Family History       Problem Relation (Age of Onset)    Miscarriages / Stillbirths Mother    No Known Problems Father          Tobacco Use    Smoking status: Former     Types: Cigarettes    Smokeless tobacco: Never   Substance and Sexual Activity    Alcohol use: Not Currently     Alcohol/week: 1.0 standard drink of alcohol     Types: 1 Glasses of wine per week    Drug use: Never    Sexual activity: Yes     Review of Systems   All other systems reviewed and are negative.    Objective:     Vital Signs (Most Recent):  Temp: 98.4 °F (36.9 °C) (12/24/23 1159)  Pulse: 110 (12/24/23 1617)  Resp: 18 (12/24/23 1611)  BP: (!) 167/89 (12/24/23 1611)  SpO2: 98 % (12/24/23 1617) Vital Signs (24h Range):  Temp:  [98.2 °F (36.8 °C)-98.7 °F (37.1 °C)] 98.4 °F (36.9 °C)  Pulse:  [] 110  Resp:  [17-22] 18  SpO2:  [76 %-100 %] 98 %  BP:  (118-167)/(68-95) 167/89     Weight: 98.9 kg (218 lb)  Body mass index is 39.87 kg/m².     Physical Exam  Constitutional:       Appearance: She is obese.   HENT:      Head: Normocephalic and atraumatic.      Right Ear: External ear normal.      Left Ear: External ear normal.      Nose: Nose normal.      Mouth/Throat:      Mouth: Mucous membranes are moist.   Eyes:      Extraocular Movements: Extraocular movements intact.   Cardiovascular:      Rate and Rhythm: Normal rate and regular rhythm.      Heart sounds: No murmur heard.  Pulmonary:      Effort: Pulmonary effort is normal. No respiratory distress.      Breath sounds: No wheezing.   Abdominal:      General: There is distension.      Comments: Fetal monitoring in place   Musculoskeletal:         General: Swelling present.      Right lower leg: Edema present.      Left lower leg: Edema present.      Comments: Diffuse swelling of legs bilaterally   Skin:     General: Skin is warm.   Neurological:      Mental Status: She is alert and oriented to person, place, and time.      Comments: Sensation intact in all extremities w/mild numbness in R hand  CN 2-12 grossly intact   4/5 strength in all extremities    Psychiatric:         Mood and Affect: Mood normal.                Significant Labs: All pertinent labs within the past 24 hours have been reviewed.    Significant Imaging: I have reviewed all pertinent imaging results/findings within the past 24 hours.

## 2023-12-24 NOTE — ASSESSMENT & PLAN NOTE
IUP at 32 weeks and 3 days gestational age    Neuro complaints-now mostly resolved.  Tele stroke evaluated patient states no need for brain imaging at this time..  Unclear picture as to whether neuro complaints are related to preeclampsia versus migraine with aura but because patient with extreme edema, 2+ proteinuria, type 1 diabetes, and now neuro complaints will proceed with primary  section.  Discussed with patient's primary OB Dr. Moore, plan was for primary  at 36 weeks.  Discussed with patient, voices understanding.  Patient started  on magnesium sulfate and will remain so after delivery.    Type 1 diabetes-on insulin pump       <-- Click to add NO significant Past Surgical History

## 2023-12-24 NOTE — NURSING
1434- pt called nurses station. RN at bedside. Pt having trouble forming her words and completing a full sentence. 2nd RN called to bedside. Pt states her left side of her face is numb, along with numbness and tingling in hands. Glucose 210 BP  1436- Dr. Connolly notified  1438- rapid response called  1441- rapid response team at bedside  1444-  code stroke called  1447- symptoms getting better  1453- Dr. Cononlly at bedside    1500- Dr. Connolly gave orders for Mag 4g loading dose followed by a 2g maintenance dose. Mag level q6hr. Mag to continue 24 hours post c/s.     1522- neuro tele at bedside.stroke ruled out by neuro and no need for ct scan  1527- Dr. Connolly at bedside. Patient plan of care discussed. Patient notified that she will be having a csection later today around 1800

## 2023-12-25 LAB
ALBUMIN SERPL BCP-MCNC: 2.4 G/DL (ref 3.5–5.2)
ALP SERPL-CCNC: 91 U/L (ref 55–135)
ALT SERPL W/O P-5'-P-CCNC: 9 U/L (ref 10–44)
ANION GAP SERPL CALC-SCNC: 8 MMOL/L (ref 8–16)
AST SERPL-CCNC: 17 U/L (ref 10–40)
BACTERIA UR CULT: NORMAL
BACTERIA UR CULT: NORMAL
BASOPHILS # BLD AUTO: 0.02 K/UL (ref 0–0.2)
BASOPHILS # BLD AUTO: 0.02 K/UL (ref 0–0.2)
BASOPHILS # BLD AUTO: 0.03 K/UL (ref 0–0.2)
BASOPHILS # BLD AUTO: 0.04 K/UL (ref 0–0.2)
BASOPHILS NFR BLD: 0.2 % (ref 0–1.9)
BASOPHILS NFR BLD: 0.2 % (ref 0–1.9)
BASOPHILS NFR BLD: 0.3 % (ref 0–1.9)
BASOPHILS NFR BLD: 0.4 % (ref 0–1.9)
BILIRUB DIRECT SERPL-MCNC: 0 MG/DL (ref 0.1–0.3)
BILIRUB SERPL-MCNC: 0.2 MG/DL (ref 0.1–1)
BUN SERPL-MCNC: 9 MG/DL (ref 6–20)
CALCIUM SERPL-MCNC: 8 MG/DL (ref 8.7–10.5)
CHLORIDE SERPL-SCNC: 103 MMOL/L (ref 95–110)
CO2 SERPL-SCNC: 22 MMOL/L (ref 23–29)
CREAT SERPL-MCNC: 0.6 MG/DL (ref 0.5–1.4)
DIFFERENTIAL METHOD: ABNORMAL
EOSINOPHIL # BLD AUTO: 0 K/UL (ref 0–0.5)
EOSINOPHIL # BLD AUTO: 0.1 K/UL (ref 0–0.5)
EOSINOPHIL NFR BLD: 0.2 % (ref 0–8)
EOSINOPHIL NFR BLD: 0.3 % (ref 0–8)
EOSINOPHIL NFR BLD: 0.4 % (ref 0–8)
EOSINOPHIL NFR BLD: 0.6 % (ref 0–8)
ERYTHROCYTE [DISTWIDTH] IN BLOOD BY AUTOMATED COUNT: 13.1 % (ref 11.5–14.5)
ERYTHROCYTE [DISTWIDTH] IN BLOOD BY AUTOMATED COUNT: 13.2 % (ref 11.5–14.5)
EST. GFR  (NO RACE VARIABLE): >60 ML/MIN/1.73 M^2
FERRITIN SERPL-MCNC: 9.8 NG/ML (ref 20–300)
GLUCOSE SERPL-MCNC: 187 MG/DL (ref 70–110)
GLUCOSE SERPL-MCNC: 189 MG/DL (ref 70–110)
GLUCOSE SERPL-MCNC: 191 MG/DL (ref 70–110)
GLUCOSE SERPL-MCNC: 191 MG/DL (ref 70–110)
GLUCOSE SERPL-MCNC: 98 MG/DL (ref 70–110)
HCT VFR BLD AUTO: 26.4 % (ref 37–48.5)
HCT VFR BLD AUTO: 27.2 % (ref 37–48.5)
HCT VFR BLD AUTO: 27.3 % (ref 37–48.5)
HCT VFR BLD AUTO: 29.1 % (ref 37–48.5)
HGB BLD-MCNC: 8.4 G/DL (ref 12–16)
HGB BLD-MCNC: 8.8 G/DL (ref 12–16)
HGB BLD-MCNC: 8.8 G/DL (ref 12–16)
HGB BLD-MCNC: 9.2 G/DL (ref 12–16)
IMM GRANULOCYTES # BLD AUTO: 0.04 K/UL (ref 0–0.04)
IMM GRANULOCYTES # BLD AUTO: 0.05 K/UL (ref 0–0.04)
IMM GRANULOCYTES # BLD AUTO: 0.06 K/UL (ref 0–0.04)
IMM GRANULOCYTES # BLD AUTO: 0.06 K/UL (ref 0–0.04)
IMM GRANULOCYTES NFR BLD AUTO: 0.4 % (ref 0–0.5)
IMM GRANULOCYTES NFR BLD AUTO: 0.5 % (ref 0–0.5)
IMM GRANULOCYTES NFR BLD AUTO: 0.6 % (ref 0–0.5)
IMM GRANULOCYTES NFR BLD AUTO: 0.6 % (ref 0–0.5)
IRON SERPL-MCNC: 134 UG/DL (ref 30–160)
LYMPHOCYTES # BLD AUTO: 1.4 K/UL (ref 1–4.8)
LYMPHOCYTES # BLD AUTO: 1.5 K/UL (ref 1–4.8)
LYMPHOCYTES # BLD AUTO: 1.9 K/UL (ref 1–4.8)
LYMPHOCYTES # BLD AUTO: 2.1 K/UL (ref 1–4.8)
LYMPHOCYTES NFR BLD: 13.1 % (ref 18–48)
LYMPHOCYTES NFR BLD: 15.9 % (ref 18–48)
LYMPHOCYTES NFR BLD: 18.9 % (ref 18–48)
LYMPHOCYTES NFR BLD: 20.4 % (ref 18–48)
MAGNESIUM SERPL-MCNC: 2.6 MG/DL (ref 1.6–2.6)
MAGNESIUM SERPL-MCNC: 3.1 MG/DL (ref 1.6–2.6)
MAGNESIUM SERPL-MCNC: 3.3 MG/DL (ref 1.6–2.6)
MCH RBC QN AUTO: 25.9 PG (ref 27–31)
MCH RBC QN AUTO: 25.9 PG (ref 27–31)
MCH RBC QN AUTO: 26 PG (ref 27–31)
MCH RBC QN AUTO: 26.1 PG (ref 27–31)
MCHC RBC AUTO-ENTMCNC: 31.6 G/DL (ref 32–36)
MCHC RBC AUTO-ENTMCNC: 31.8 G/DL (ref 32–36)
MCHC RBC AUTO-ENTMCNC: 32.2 G/DL (ref 32–36)
MCHC RBC AUTO-ENTMCNC: 32.4 G/DL (ref 32–36)
MCV RBC AUTO: 80 FL (ref 82–98)
MCV RBC AUTO: 81 FL (ref 82–98)
MCV RBC AUTO: 82 FL (ref 82–98)
MCV RBC AUTO: 82 FL (ref 82–98)
MONOCYTES # BLD AUTO: 0.6 K/UL (ref 0.3–1)
MONOCYTES # BLD AUTO: 0.6 K/UL (ref 0.3–1)
MONOCYTES # BLD AUTO: 0.7 K/UL (ref 0.3–1)
MONOCYTES # BLD AUTO: 0.7 K/UL (ref 0.3–1)
MONOCYTES NFR BLD: 5.1 % (ref 4–15)
MONOCYTES NFR BLD: 5.9 % (ref 4–15)
MONOCYTES NFR BLD: 6.7 % (ref 4–15)
MONOCYTES NFR BLD: 7 % (ref 4–15)
NEUTROPHILS # BLD AUTO: 6.7 K/UL (ref 1.8–7.7)
NEUTROPHILS # BLD AUTO: 7.4 K/UL (ref 1.8–7.7)
NEUTROPHILS # BLD AUTO: 8.2 K/UL (ref 1.8–7.7)
NEUTROPHILS # BLD AUTO: 8.3 K/UL (ref 1.8–7.7)
NEUTROPHILS NFR BLD: 71.3 % (ref 38–73)
NEUTROPHILS NFR BLD: 75 % (ref 38–73)
NEUTROPHILS NFR BLD: 76.9 % (ref 38–73)
NEUTROPHILS NFR BLD: 79.1 % (ref 38–73)
NRBC BLD-RTO: 0 /100 WBC
PLATELET # BLD AUTO: 241 K/UL (ref 150–450)
PLATELET # BLD AUTO: 252 K/UL (ref 150–450)
PLATELET # BLD AUTO: 258 K/UL (ref 150–450)
PLATELET # BLD AUTO: 268 K/UL (ref 150–450)
PMV BLD AUTO: 12.2 FL (ref 9.2–12.9)
PMV BLD AUTO: 12.3 FL (ref 9.2–12.9)
PMV BLD AUTO: 12.3 FL (ref 9.2–12.9)
PMV BLD AUTO: 12.6 FL (ref 9.2–12.9)
POTASSIUM SERPL-SCNC: 3.7 MMOL/L (ref 3.5–5.1)
PROT SERPL-MCNC: 4.5 G/DL (ref 6–8.4)
RBC # BLD AUTO: 3.24 M/UL (ref 4–5.4)
RBC # BLD AUTO: 3.37 M/UL (ref 4–5.4)
RBC # BLD AUTO: 3.39 M/UL (ref 4–5.4)
RBC # BLD AUTO: 3.55 M/UL (ref 4–5.4)
SATURATED IRON: 27 % (ref 20–50)
SODIUM SERPL-SCNC: 133 MMOL/L (ref 136–145)
TOTAL IRON BINDING CAPACITY: 503 UG/DL (ref 250–450)
TRANSFERRIN SERPL-MCNC: 359 MG/DL (ref 200–375)
WBC # BLD AUTO: 10.48 K/UL (ref 3.9–12.7)
WBC # BLD AUTO: 10.88 K/UL (ref 3.9–12.7)
WBC # BLD AUTO: 9.42 K/UL (ref 3.9–12.7)
WBC # BLD AUTO: 9.63 K/UL (ref 3.9–12.7)

## 2023-12-25 PROCEDURE — 83540 ASSAY OF IRON: CPT | Performed by: SPECIALIST

## 2023-12-25 PROCEDURE — 80076 HEPATIC FUNCTION PANEL: CPT | Performed by: SPECIALIST

## 2023-12-25 PROCEDURE — 36415 COLL VENOUS BLD VENIPUNCTURE: CPT | Performed by: SPECIALIST

## 2023-12-25 PROCEDURE — 25000003 PHARM REV CODE 250: Performed by: SPECIALIST

## 2023-12-25 PROCEDURE — 86592 SYPHILIS TEST NON-TREP QUAL: CPT | Performed by: SPECIALIST

## 2023-12-25 PROCEDURE — 82728 ASSAY OF FERRITIN: CPT | Performed by: SPECIALIST

## 2023-12-25 PROCEDURE — 94761 N-INVAS EAR/PLS OXIMETRY MLT: CPT

## 2023-12-25 PROCEDURE — 85025 COMPLETE CBC W/AUTO DIFF WBC: CPT | Mod: 91 | Performed by: SPECIALIST

## 2023-12-25 PROCEDURE — 83735 ASSAY OF MAGNESIUM: CPT | Performed by: SPECIALIST

## 2023-12-25 PROCEDURE — 84466 ASSAY OF TRANSFERRIN: CPT | Performed by: SPECIALIST

## 2023-12-25 PROCEDURE — 63600175 PHARM REV CODE 636 W HCPCS: Performed by: SPECIALIST

## 2023-12-25 PROCEDURE — 12000002 HC ACUTE/MED SURGE SEMI-PRIVATE ROOM

## 2023-12-25 PROCEDURE — 80048 BASIC METABOLIC PNL TOTAL CA: CPT | Performed by: SPECIALIST

## 2023-12-25 PROCEDURE — 83735 ASSAY OF MAGNESIUM: CPT | Mod: 91 | Performed by: SPECIALIST

## 2023-12-25 RX ORDER — IBUPROFEN 400 MG/1
800 TABLET ORAL EVERY 6 HOURS PRN
Status: DISCONTINUED | OUTPATIENT
Start: 2023-12-25 | End: 2023-12-29 | Stop reason: HOSPADM

## 2023-12-25 RX ADMIN — DOCUSATE SODIUM 200 MG: 100 CAPSULE, LIQUID FILLED ORAL at 10:12

## 2023-12-25 RX ADMIN — DIPHENHYDRAMINE HYDROCHLORIDE 25 MG: 25 CAPSULE ORAL at 12:12

## 2023-12-25 RX ADMIN — MUPIROCIN 2 G: 20 OINTMENT TOPICAL at 09:12

## 2023-12-25 RX ADMIN — IBUPROFEN 600 MG: 400 TABLET ORAL at 06:12

## 2023-12-25 RX ADMIN — ONDANSETRON 4 MG: 2 INJECTION INTRAMUSCULAR; INTRAVENOUS at 11:12

## 2023-12-25 RX ADMIN — IBUPROFEN 600 MG: 400 TABLET ORAL at 11:12

## 2023-12-25 RX ADMIN — PRENATAL VIT W/ FE FUMARATE-FA TAB 27-0.8 MG 1 TABLET: 27-0.8 TAB at 09:12

## 2023-12-25 RX ADMIN — OXYCODONE HYDROCHLORIDE AND ACETAMINOPHEN 1 TABLET: 10; 325 TABLET ORAL at 10:12

## 2023-12-25 RX ADMIN — PANTOPRAZOLE SODIUM 40 MG: 40 TABLET, DELAYED RELEASE ORAL at 06:12

## 2023-12-25 RX ADMIN — DOCUSATE SODIUM 200 MG: 100 CAPSULE, LIQUID FILLED ORAL at 09:12

## 2023-12-25 NOTE — PROGRESS NOTES
[facility]  Obstetrics  Postpartum Progress Note    Patient Name: Yana Richards  MRN: [id number]  Admission Date: 2023  Hospital Length of Stay: 2 days  Attending Physician: Ann Moore MD  Primary Care Provider: Michelle Doherty APRN-NAV    Subjective:     Principal Problem:Edema during pregnancy in third trimester    Hospital Course:  No notes on file    Interval History:  Postop day 1.-status post T-xiieeji-ax complaints this morning, headache resolved, edema significantly improved.    She is doing well this morning. She is tolerating a regular diet without nausea or vomiting. She is not voiding spontaneously. She is not ambulating. She has not passed flatus, and has not a BM. Vaginal bleeding is mild. She denies fever or chills. Abdominal pain is mild and controlled with oral medications.    Objective:     Vital Signs (Most Recent):  Temp: 98.3 °F (36.8 °C) (23 0716)  Pulse: 99 (23 1031)  Resp: 19 (23 1031)  BP: 112/66 (23 1001)  SpO2: 97 % (23 1031) Vital Signs (24h Range):  Temp:  [98 °F (36.7 °C)-98.4 °F (36.9 °C)] 98.3 °F (36.8 °C)  Pulse:  [] 99  Resp:  [15-25] 19  SpO2:  [76 %-100 %] 97 %  BP: (112-167)/(66-94) 112/66     Weight: 98.9 kg (218 lb)  Body mass index is 39.87 kg/m².      Intake/Output Summary (Last 24 hours) at 2023 1147  Last data filed at 2023 1145  Gross per 24 hour   Intake 2571.26 ml   Output 2784 ml   Net -212.74 ml         Significant Labs:  Lab Results   Component Value Date    GROUPTRH B POS 2023     Recent Labs   Lab 23  0611   HGB 8.8*   HCT 27.3*       I have personallly reviewed all pertinent lab results from the last 24 hours.    Physical Exam  General-no apparent distress, resting comfortably in bed   Abdomen-soft nontender with active bowel sounds   Uterus-firm below the umbilicus  Incision/bandage minimal old blood on bandage otherwise clean dry intact  Extremities-no calf tenderness-still with  edema but significant improvement compared with yesterday's exam     Review of Systems  Assessment/Plan:     23 y.o. female  for:    * Edema during pregnancy in third trimester  Pod 1.-status post V-wdthfnh-brrvi well  Type 1 diabetes-well controlled overnight   Likely preeclampsia-good diuresis with approximately 2000 cc since 8:00 p.m. yesterday , no neurologic complaints  Routine advances orders-will discontinue magnesium and transfer patient to postpartum floor          Disposition: As patient meets milestones, will plan to discharge .    Danitza Connolly MD  Obstetrics  [facility]

## 2023-12-25 NOTE — NURSING TRANSFER
Nursing Transfer Note      12/25/2023   1:48 PM      Reason patient is being transferred: returning to L&D     Transfer To: 2302    Transfer via bed    Transported by Kelvin ESCAMILLA    Transfer Vital Signs:  Blood Pressure: 122/76  Heart Rate: 90  O2: 98%  Temperature: 98.2  Respirations: 12    Order for Tele Monitor? No    Additional Lines: Mendoza Catheter    Medicines sent: Magnesium drip    Patient belongings transferred with patient: Yes    Chart send with patient: Yes    Notified: spouse, friend    Patient reassessed at: 12/25/23 13:30   Upon arrival to floor: patient oriented to room, call bell in reach, and bed in lowest position

## 2023-12-25 NOTE — L&D DELIVERY NOTE
[facility]   Section   Operative Note    SUMMARY     Date of Procedure: 2023     Procedure: Procedure(s) (LRB):   SECTION (N/A)    Surgeon(s) and Role:     * Samy Connolly MD - Primary    Assisting Surgeon: None    Pre-Operative Diagnosis: Macrosomia [P08.0] type 1 diabetes, PH versus preeclampsia, neurologic symptoms, extreme edema in pregnancy    Post-Operative Diagnosis: Post-Op Diagnosis Codes:     * Macrosomia [P08.0] same    Anesthesia: Epidural    Technical Procedures Used:  Primary low transverse  section           Description of the Findings of the Procedure:  Significant polyhydramnios, normal-appearing female anatomy    Significant Surgical Tasks Conducted by the Assistant(s), if Applicable:  Typical    Complications: No    Blood Loss: * No values recorded between 2023 12:00 AM and 2023  7:36 PM *  cc     With patient in supine position, the legs are  and Mendoza Catheter placed and positioning to supine done.   Abdomen prepped with Chloroprep and 3 minute drying time allowed prior to draping of the abdomen.   Time out taken with OR team members.  Pfannenstiel Incision made through the skin, transverse fascial incision developed, rectus muscles  in the midline and the peritoneum entered.   no adhesions noted.  Jeff wound retractor placed.  The lower uterine segment and position of the fetus identified.   Bladder flap taken down through transverse peritoneal incision.    Low Transverse Incision made through well developer lower uterine segment and extended laterally with blunt dissection.   Clear fluid noted.  Infant delivered from vertex presentation.  Cord clamped after one minute and  handed to attending nurse.  Cord blood taken, placenta delivered.  The uterus wasnot exteriorized.  The edges of the uterine incision are grasped with Hassan clamps at the angles and the inferior and superior midline edges of  the incision.    Closure with running lock 0 Monocryl starting at each angle, tying in the midline.  A 2nd 0 Monocryl was used to imbricate the hysterotomy line.  Observation for bleeding with suture of any bleeding along the hysterotomy line.   With good hemostasis noted, the anterior pelvis is rinsed with sterile saline.   Right and left adnexa with normal anatomy.  Jeff wound retractor removed.     Closure of the abdomen with 2 0 Vicryl running of the peritoneum, fascial closure with Maxon starting at the distal angle and tying the knot at the proximal angle.  Exparel injected   Skin closure with 4 0 Vicryl subcuticular.  Wound dressed with pressure dressing.          Specimens:   Specimen (24h ago, onward)      None            Condition: Good    Disposition: PACU - hemodynamically stable.    Attestation: Good     Viable male infant with Apgar scores 8/9, weight 6 lb 10 oz    Delivery Information for Keenan Richards    Birth information:  YOB: [birth year]23   Time of birth: 6:50 PM   Sex: male   Head Delivery Date/Time:     Delivery type:    Gestational Age: 32w3d        Delivery Providers    Delivering clinician:            Measurements    Weight:   Length:          Apgars    Living status:   Apgar Component Scores:  1 min.:  5 min.:  10 min.:  15 min.:  20 min.:    Skin color:         Heart rate:         Reflex irritability:         Muscle tone:         Respiratory effort:         Total:                                  Interventions/Resuscitation           Cord    No data filed       Placenta    Placenta delivery date/time:   Placenta removal:            Labor Events:       labor:       Labor Onset Date/Time:         Dilation Complete Date/Time:         Start Pushing Date/Time:         Start Pushing Date/Time:       Rupture Date/Time:            Rupture type:          Fluid Amount:       Fluid Color:                steroids:       Antibiotics given for GBS:       Induction:        Indications for induction:        Augmentation:       Indications for augmentation:       Labor complications:       Additional complications:          Cervical ripening:                     Delivery:      Episiotomy:       Indication for Episiotomy:       Perineal Lacerations:   Repaired:      Periurethral Laceration:   Repaired:     Labial Laceration:   Repaired:     Sulcus Laceration:   Repaired:     Vaginal Laceration:   Repaired:     Cervical Laceration:   Repaired:     Repair suture:       Repair # of packets:       Last Value - EBL - Nursing (mL):       Sum - EBL - Nursing (mL): 0     Last Value - EBL - Anesthesia (mL):      Calculated QBL (mL):       Vaginal Sweep Performed:       Surgicount Correct:         Other providers:            Details (if applicable):  Trial of Labor      Categorization:      Priority:     Indications for :     Incision Type:       Additional  information:  Forceps:    Vacuum:    Breech:    Observed anomalies    Other (Comments):

## 2023-12-25 NOTE — ASSESSMENT & PLAN NOTE
-wears insulin pump at home   -continue insulin pump  -no need for insulin gtt   -change accuchecks to ACHS  -from DM standpoint, patient no longer needs ICU level care

## 2023-12-25 NOTE — NURSING TRANSFER
Nursing Transfer Note      12/24/2023   11:57 PM    Nurse giving handoff:Jaja   Nurse receiving handoff:Elisabeth     Reason patient is being transferred: close monitoring after delivery     Transfer To: 2205    Transfer via bed    Transfer with 2l to O2    Transported by bed    Transfer Vital Signs:    Heart Rate:110  O2:98    Respirations:18      Telemetry: Box Number na  Order for Tele Monitor? No    Additional Lines: Oxygen and Mendoza Catheter    4eyes on Skin: yes    Medicines sent: yes     Any special needs or follow-up needed: cbg q3. Notify MD for mag level greater than or equal to 7.5mg/dl. cbc and mag Q6 starting at 0000.     Patient belongings transferred with patient: Yes    Chart send with patient: Yes    Notified: friend    Patient reassessed at:    12/24/23    2230      Upon arrival to floor: patient oriented to room, call bell in reach, and bed in lowest position

## 2023-12-25 NOTE — SUBJECTIVE & OBJECTIVE
Interval History:  Postop day 1.-status post P-zmcfxoc-cy complaints this morning, headache resolved, edema significantly improved.    She is doing well this morning. She is tolerating a regular diet without nausea or vomiting. She is not voiding spontaneously. She is not ambulating. She has not passed flatus, and has not a BM. Vaginal bleeding is mild. She denies fever or chills. Abdominal pain is mild and controlled with oral medications.    Objective:     Vital Signs (Most Recent):  Temp: 98.3 °F (36.8 °C) (23 0716)  Pulse: 99 (23 1031)  Resp: 19 (23 1031)  BP: 112/66 (23 1001)  SpO2: 97 % (23 1031) Vital Signs (24h Range):  Temp:  [98 °F (36.7 °C)-98.4 °F (36.9 °C)] 98.3 °F (36.8 °C)  Pulse:  [] 99  Resp:  [15-25] 19  SpO2:  [76 %-100 %] 97 %  BP: (112-167)/(66-94) 112/66     Weight: 98.9 kg (218 lb)  Body mass index is 39.87 kg/m².      Intake/Output Summary (Last 24 hours) at 2023 1147  Last data filed at 2023 1145  Gross per 24 hour   Intake 2571.26 ml   Output 2784 ml   Net -212.74 ml         Significant Labs:  Lab Results   Component Value Date    GROUPTRH B POS 2023     Recent Labs   Lab 23  0611   HGB 8.8*   HCT 27.3*       I have personallly reviewed all pertinent lab results from the last 24 hours.    Physical Exam  General-no apparent distress, resting comfortably in bed   Abdomen-soft nontender with active bowel sounds   Uterus-firm below the umbilicus  Incision/bandage minimal old blood on bandage otherwise clean dry intact  Extremities-no calf tenderness-still with edema but significant improvement compared with yesterday's exam     Review of Systems

## 2023-12-25 NOTE — ANESTHESIA PROCEDURE NOTES
Epidural    Patient location during procedure: OB   Reason for block: primary anesthetic   Reason for block: labor analgesia requested by patient and obstetrician  Diagnosis: IUP   Start time: 12/24/2023 6:05 PM  Timeout: 12/24/2023 6:05 PM  End time: 12/24/2023 6:15 PM    Staffing  Performing Provider: Luís Cornejo MD  Authorizing Provider: Luís Cornejo MD    Staffing  Performed by: Luís Cornejo MD  Authorized by: Luís Cornejo MD        Preanesthetic Checklist  Completed: patient identified, IV checked, site marked, risks and benefits discussed, surgical consent, monitors and equipment checked, pre-op evaluation, timeout performed, anesthesia consent given, hand hygiene performed and patient being monitored  Preparation  Patient position: sitting  Prep: Betadine  Patient monitoring: ECG and Blood Pressure  Reason for block: primary anesthetic   Epidural  Skin Anesthetic: lidocaine 1%  Skin Wheal: 3 mL  Administration type: continuous  Approach: midline  Interspace: L3-4    Injection technique: HERNESTO air  Needle and Epidural Catheter  Needle type: Tuohy   Needle gauge: 17  Needle length: 3.5 inches  Needle insertion depth: 6 cm  Catheter type: springwound and multi-orifice  Catheter size: 19 G  Catheter at skin depth: 9 cm  Insertion Attempts: 2  Test dose: 3 mL of lidocaine 2% with Epi 1-to-200,000  Additional Documentation: incremental injection, no paresthesia on injection, no significant pain on injection, negative aspiration for heme and CSF, no signs/symptoms of IV or SA injection and no significant complaints from patient  Needle localization: anatomical landmarks  Assessment  Ease of block: easy  Patient's tolerance of the procedure: comfortable throughout block and no complaints  Additional Notes  Epidural catheter inserted easily but patient developed persistent right leg paresthesia.  Catheter withdrawn with complete resolution of paresthesia.  Epidural needle directed more leftward with 2nd  attempt.  No paresthesias thereafter. No inadvertent dural puncture with Tuohy.  Dural puncture not performed with spinal needle

## 2023-12-25 NOTE — SUBJECTIVE & OBJECTIVE
Interval History: See hospital course    Review of Systems   All other systems reviewed and are negative.    Objective:     Vital Signs (Most Recent):  Temp: 98.3 °F (36.8 °C) (12/25/23 0716)  Pulse: 99 (12/25/23 1031)  Resp: 19 (12/25/23 1031)  BP: 112/66 (12/25/23 1001)  SpO2: 97 % (12/25/23 1031) Vital Signs (24h Range):  Temp:  [98 °F (36.7 °C)-98.4 °F (36.9 °C)] 98.3 °F (36.8 °C)  Pulse:  [] 99  Resp:  [15-25] 19  SpO2:  [76 %-100 %] 97 %  BP: (112-167)/(66-94) 112/66     Weight: 98.9 kg (218 lb)  Body mass index is 39.87 kg/m².    Intake/Output Summary (Last 24 hours) at 12/25/2023 1137  Last data filed at 12/25/2023 0637  Gross per 24 hour   Intake 2571.26 ml   Output 2484 ml   Net 87.26 ml         Physical Exam  Constitutional:       Appearance: She is obese.   HENT:      Head: Normocephalic and atraumatic.      Right Ear: External ear normal.      Left Ear: External ear normal.      Nose: Nose normal.   Eyes:      Extraocular Movements: Extraocular movements intact.   Cardiovascular:      Rate and Rhythm: Normal rate and regular rhythm.   Pulmonary:      Effort: Pulmonary effort is normal. No respiratory distress.      Breath sounds: No wheezing.      Comments: On 3L NC  Abdominal:      General: Bowel sounds are normal.      Comments: Abdominal binder in place   Musculoskeletal:         General: Swelling present.   Skin:     General: Skin is warm.   Neurological:      General: No focal deficit present.      Mental Status: She is alert.      Comments: No facial numbness. No hand numbness. 5/5 strength in all extremities. No aphasia or dysarthria   Positive tinnel sign on left hand   Psychiatric:         Mood and Affect: Mood normal.         Behavior: Behavior normal.             Significant Labs: All pertinent labs within the past 24 hours have been reviewed.    Significant Imaging: I have reviewed all pertinent imaging results/findings within the past 24 hours.

## 2023-12-25 NOTE — NURSING
Patient assessment completed at this time. No needs or complaints. Patient voiced understanding of room and call light. Will continue to monitor closely.

## 2023-12-25 NOTE — ASSESSMENT & PLAN NOTE
Pod 1.-status post U-xiepupm-nuzos well  Type 1 diabetes-well controlled overnight   Likely preeclampsia-good diuresis with approximately 2000 cc since 8:00 p.m. yesterday , no neurologic complaints  Routine advances orders-will discontinue magnesium and transfer patient to postpartum floor

## 2023-12-25 NOTE — ASSESSMENT & PLAN NOTE
Evaluated by tele-stroke, numbness/dysarthria may be 2/2 migraine   -defer further imaging for now as symptoms have resolved   -positive tinel's sign on left hand, patient may also have some carpal tunnel dz

## 2023-12-25 NOTE — TRANSFER OF CARE
"Anesthesia Transfer of Care Note    Patient: Yana Richards    Procedure(s) Performed: Procedure(s) (LRB):   SECTION (N/A)    Patient location: Labor and Delivery    Anesthesia Type: epidural    Transport from OR: Transported from OR on room air with adequate spontaneous ventilation    Post pain: adequate analgesia    Post assessment: no apparent anesthetic complications    Post vital signs: stable    Level of consciousness: awake    Nausea/Vomiting: no nausea/vomiting    Complications: none    Transfer of care protocol was followedComments: Patient stable, report to RN, questions answered.  I am available during the recovery time for any further needs       Last vitals: Visit Vitals  BP (!) 141/87   Pulse (!) 113   Temp 36.7 °C (98 °F)   Resp 18   Ht 5' 2" (1.575 m)   Wt 98.9 kg (218 lb)   SpO2 98%   Breastfeeding No   BMI 39.87 kg/m²     "

## 2023-12-25 NOTE — NURSING
Patient worried about blood sugar reading being in the 90's from 140's even after eating and her sub q. Pump has given her 3 units of regular insulin, states she feels bad, also states she feels like it could be the magnesium she got earlier, VS taken WNL, patient sitting at side of bed, encouraged patient to rest and to call me if she feels any worse and I will recheck her blood sugar.

## 2023-12-25 NOTE — NURSING
Received patient to room 2302, via bed, IV fluids discontinued , Dressing changed , keyes discontinued, deep breath and cough exercise done, patient given I&S, quick bath bath given and patient dangled at side of bed, encouraged more deep breath and cough , once 02 sat. Above 95% , assisted up to W/C to go to NICU to visit with infant.

## 2023-12-25 NOTE — NURSING
Patient is resting quietly, respirations even and non labored. No needs or complaints at this time. All lab specimens collected. Report given to next shift RN.

## 2023-12-25 NOTE — PROGRESS NOTES
[facility]  Progress Note    Patient Name: Yana Richards  MRN: [id number]  Patient Class: IP- Inpatient   Admission Date: 2023  Length of Stay: 2 days  Attending Physician: Ann Moore MD  Primary Care Provider: Michelle Doherty APRN-CNP        Subjective:     Principal Problem:Edema during pregnancy in third trimester        HPI:  Yana Richards is a 24 yo  32w3d w/pmhx of T1DM on insulin pump who was admitted to the L&D floor w/pre-eclampsia and monitoring. Today, she began having a headache. A rapid response was then later called when she began having dysarthria and R hand numbness. By my arrival, patient's dysarthria had resolved. Her numbness had also improved. Upon my exam, CN 2-12 were grossly intact, sensation intact in all extremities, 4/5 strength in all extremities. Sugar 210. Her insulin pump had been removed briefly after it came off during changing and was off for about 3 hours. Systolic BP in the 140s. Tele-stroke was called to evaluate due to patient's risk of radiation w/CT head and recommended deferring further imaging for now as symptoms had improved. In addition, symptoms were thought to be more from migraine w/aura vs pre-eclampsia. Hospital medicine consulted for diabetes management. Patient will have  today and will be transferred to the ICU afterwards.     Overview/Hospital Course:  Patient POD 1 from . Doing well. Insulin pump continued. Sugars have been from 180-220s. Tolerating diet. She no longer has any dysarthria. Only reporting mild left hand numbness.     Interval History: See hospital course    Review of Systems   All other systems reviewed and are negative.    Objective:     Vital Signs (Most Recent):  Temp: 98.3 °F (36.8 °C) (23 0716)  Pulse: 99 (23 1031)  Resp: 19 (23 1031)  BP: 112/66 (23 1001)  SpO2: 97 % (23 1031) Vital Signs (24h Range):  Temp:  [98 °F (36.7 °C)-98.4 °F (36.9 °C)] 98.3  °F (36.8 °C)  Pulse:  [] 99  Resp:  [15-25] 19  SpO2:  [76 %-100 %] 97 %  BP: (112-167)/(66-94) 112/66     Weight: 98.9 kg (218 lb)  Body mass index is 39.87 kg/m².    Intake/Output Summary (Last 24 hours) at 2023 1137  Last data filed at 2023 0637  Gross per 24 hour   Intake 2571.26 ml   Output 2484 ml   Net 87.26 ml         Physical Exam  Constitutional:       Appearance: She is obese.   HENT:      Head: Normocephalic and atraumatic.      Right Ear: External ear normal.      Left Ear: External ear normal.      Nose: Nose normal.   Eyes:      Extraocular Movements: Extraocular movements intact.   Cardiovascular:      Rate and Rhythm: Normal rate and regular rhythm.   Pulmonary:      Effort: Pulmonary effort is normal. No respiratory distress.      Breath sounds: No wheezing.      Comments: On 3L NC  Abdominal:      General: Bowel sounds are normal.      Comments: Abdominal binder in place   Musculoskeletal:         General: Swelling present.   Skin:     General: Skin is warm.   Neurological:      General: No focal deficit present.      Mental Status: She is alert.      Comments: No facial numbness. No hand numbness. 5/5 strength in all extremities. No aphasia or dysarthria   Positive tinnel sign on left hand   Psychiatric:         Mood and Affect: Mood normal.         Behavior: Behavior normal.             Significant Labs: All pertinent labs within the past 24 hours have been reviewed.    Significant Imaging: I have reviewed all pertinent imaging results/findings within the past 24 hours.    Assessment/Plan:      * Edema during pregnancy in third trimester  Likely 2/2 pre-eclampsia. Improving        32 weeks gestation of pregnancy  -care per ob  -s/p  on       Migraine without status migrainosus, not intractable  Evaluated by tele-stroke, numbness/dysarthria may be 2/2 migraine   -defer further imaging for now as symptoms have resolved   -positive tinel's sign on left hand, patient  may also have some carpal tunnel dz      Type 1 diabetes mellitus with hyperglycemia  -wears insulin pump at home   -continue insulin pump  -no need for insulin gtt   -change accuchecks to ACHS  -from DM standpoint, patient no longer needs ICU level care       VTE Risk Mitigation (From admission, onward)           Ordered     IP VTE HIGH RISK PATIENT  Once         12/24/23 2242     Place sequential compression device  Until discontinued         12/24/23 2242                    Discharge Planning   OSCAR:      Code Status: Full Code   Is the patient medically ready for discharge?:     Reason for patient still in hospital (select all that apply): Treatment               Critical care time spent on the evaluation and treatment of severe organ dysfunction, review of pertinent labs and imaging studies, discussions with consulting providers and discussions with patient/family: 32 minutes.      Kalia Meeks MD  Department of Hospital Medicine   [facility]

## 2023-12-25 NOTE — ANESTHESIA POSTPROCEDURE EVALUATION
Anesthesia Post Evaluation    Patient: Yana Richards    Procedure(s) Performed: Procedure(s) (LRB):   SECTION (N/A)    Final Anesthesia Type: epidural      Patient location during evaluation: ICU  Patient participation: Yes- Able to Participate  Level of consciousness: awake and alert, oriented and awake  Post-procedure vital signs: reviewed and stable  Pain management: adequate  Airway patency: patent    PONV status at discharge: No PONV  Anesthetic complications: no      Cardiovascular status: blood pressure returned to baseline, hemodynamically stable and stable  Respiratory status: unassisted, spontaneous ventilation and room air  Hydration status: euvolemic  Follow-up not needed.  Comments: The patient was sleeping upon arrival but easily awakened and alert and oriented x4 when questioned.  She was well evidence or sedation, confusion or respiratory depression at this time.  The patient was able to demonstrate good motor and sensory to her lower extremities at this time.  The patient is without postural headache at the site.  She is without back ache at this time.  The patient denies nausea vomiting but does experience some mild pruritus for which she was instructed to request a Benadryl as provided.  The patient's epidural site was examined and found to be clean and dry without evidence of bleeding, infection or hematoma formation.  She was instructed to notify the Department of Anesthesiology with any questions, problems or concerns.  The patient demonstrates no anesthesia complications at this time.              Vitals Value Taken Time   /72 23 0700   Temp 36.8 °C (98.2 °F) 23 0400   Pulse 90 23 0716   Resp 16 23 0716   SpO2 95 % 23 0716   Vitals shown include unvalidated device data.      Event Time   Out of Recovery 2023 22:15:00         Pain/Gamal Score: Pain Rating Prior to Med Admin: 2 (2023  6:01 AM)  Pain Rating Post Med Admin: 0 (patient  sleeping, respirations even and non labored) (12/25/2023  7:01 AM)  Gamal Score: 10 (12/24/2023 10:00 PM)

## 2023-12-26 LAB
BASOPHILS # BLD AUTO: 0.02 K/UL (ref 0–0.2)
BASOPHILS NFR BLD: 0.2 % (ref 0–1.9)
DIFFERENTIAL METHOD: ABNORMAL
EOSINOPHIL # BLD AUTO: 0 K/UL (ref 0–0.5)
EOSINOPHIL NFR BLD: 0.4 % (ref 0–8)
ERYTHROCYTE [DISTWIDTH] IN BLOOD BY AUTOMATED COUNT: 13.4 % (ref 11.5–14.5)
HCT VFR BLD AUTO: 27.3 % (ref 37–48.5)
HGB BLD-MCNC: 8.7 G/DL (ref 12–16)
IMM GRANULOCYTES # BLD AUTO: 0.07 K/UL (ref 0–0.04)
IMM GRANULOCYTES NFR BLD AUTO: 0.7 % (ref 0–0.5)
LYMPHOCYTES # BLD AUTO: 1.7 K/UL (ref 1–4.8)
LYMPHOCYTES NFR BLD: 16.4 % (ref 18–48)
MCH RBC QN AUTO: 25.8 PG (ref 27–31)
MCHC RBC AUTO-ENTMCNC: 31.9 G/DL (ref 32–36)
MCV RBC AUTO: 81 FL (ref 82–98)
MONOCYTES # BLD AUTO: 0.7 K/UL (ref 0.3–1)
MONOCYTES NFR BLD: 7.1 % (ref 4–15)
NEUTROPHILS # BLD AUTO: 7.9 K/UL (ref 1.8–7.7)
NEUTROPHILS NFR BLD: 75.2 % (ref 38–73)
NRBC BLD-RTO: 0 /100 WBC
PLATELET # BLD AUTO: 267 K/UL (ref 150–450)
PMV BLD AUTO: 12.2 FL (ref 9.2–12.9)
RBC # BLD AUTO: 3.37 M/UL (ref 4–5.4)
RPR SER QL: NORMAL
WBC # BLD AUTO: 10.47 K/UL (ref 3.9–12.7)

## 2023-12-26 PROCEDURE — 25000003 PHARM REV CODE 250: Performed by: OBSTETRICS & GYNECOLOGY

## 2023-12-26 PROCEDURE — 36415 COLL VENOUS BLD VENIPUNCTURE: CPT | Performed by: SPECIALIST

## 2023-12-26 PROCEDURE — 25000003 PHARM REV CODE 250: Performed by: SPECIALIST

## 2023-12-26 PROCEDURE — 12000002 HC ACUTE/MED SURGE SEMI-PRIVATE ROOM

## 2023-12-26 PROCEDURE — 85025 COMPLETE CBC W/AUTO DIFF WBC: CPT | Performed by: SPECIALIST

## 2023-12-26 RX ORDER — SIMETHICONE 80 MG
1 TABLET,CHEWABLE ORAL 3 TIMES DAILY PRN
Status: DISCONTINUED | OUTPATIENT
Start: 2023-12-26 | End: 2023-12-29 | Stop reason: HOSPADM

## 2023-12-26 RX ADMIN — OXYCODONE HYDROCHLORIDE AND ACETAMINOPHEN 1 TABLET: 10; 325 TABLET ORAL at 06:12

## 2023-12-26 RX ADMIN — IBUPROFEN 800 MG: 400 TABLET, FILM COATED ORAL at 06:12

## 2023-12-26 RX ADMIN — DOCUSATE SODIUM 200 MG: 100 CAPSULE, LIQUID FILLED ORAL at 11:12

## 2023-12-26 RX ADMIN — OXYCODONE HYDROCHLORIDE AND ACETAMINOPHEN 1 TABLET: 10; 325 TABLET ORAL at 11:12

## 2023-12-26 RX ADMIN — OXYCODONE HYDROCHLORIDE AND ACETAMINOPHEN 1 TABLET: 10; 325 TABLET ORAL at 02:12

## 2023-12-26 RX ADMIN — SIMETHICONE 80 MG: 80 TABLET, CHEWABLE ORAL at 11:12

## 2023-12-26 RX ADMIN — IBUPROFEN 800 MG: 400 TABLET, FILM COATED ORAL at 09:12

## 2023-12-26 RX ADMIN — OXYCODONE HYDROCHLORIDE AND ACETAMINOPHEN 1 TABLET: 10; 325 TABLET ORAL at 09:12

## 2023-12-26 RX ADMIN — DOCUSATE SODIUM 200 MG: 100 CAPSULE, LIQUID FILLED ORAL at 09:12

## 2023-12-26 RX ADMIN — PANTOPRAZOLE SODIUM 40 MG: 40 TABLET, DELAYED RELEASE ORAL at 07:12

## 2023-12-26 RX ADMIN — IBUPROFEN 800 MG: 400 TABLET, FILM COATED ORAL at 02:12

## 2023-12-26 NOTE — SUBJECTIVE & OBJECTIVE
Interval History: Patient POD2. Sugars reviewed on dexcom and <250 w/last 24 hours. Feeling well. Tolerating diet.     Review of Systems   All other systems reviewed and are negative.    Objective:     Vital Signs (Most Recent):  Temp: 98.1 °F (36.7 °C) (12/26/23 1120)  Pulse: 105 (12/26/23 1120)  Resp: 18 (12/26/23 1432)  BP: (!) 153/88 (notified nurse) (12/26/23 1120)  SpO2: 96 % (12/26/23 1120) Vital Signs (24h Range):  Temp:  [97.8 °F (36.6 °C)-98.1 °F (36.7 °C)] 98.1 °F (36.7 °C)  Pulse:  [] 105  Resp:  [17-20] 18  SpO2:  [91 %-99 %] 96 %  BP: (122-153)/(76-93) 153/88     Weight: 98.9 kg (218 lb)  Body mass index is 39.87 kg/m².    Intake/Output Summary (Last 24 hours) at 12/26/2023 1510  Last data filed at 12/26/2023 0000  Gross per 24 hour   Intake --   Output 500 ml   Net -500 ml           Physical Exam  Constitutional:       Appearance: She is obese.   HENT:      Head: Normocephalic and atraumatic.      Right Ear: External ear normal.      Left Ear: External ear normal.      Nose: Nose normal.   Eyes:      Extraocular Movements: Extraocular movements intact.   Cardiovascular:      Rate and Rhythm: Normal rate and regular rhythm.   Pulmonary:      Effort: Pulmonary effort is normal. No respiratory distress.      Breath sounds: No wheezing.      Comments: Off oxygen   Abdominal:      General: Bowel sounds are normal.      Comments: Abdominal binder in place   Musculoskeletal:         General: Swelling present.   Skin:     General: Skin is warm.   Neurological:      General: No focal deficit present.      Mental Status: She is alert.      Comments: No facial numbness. No hand numbness. 5/5 strength in all extremities. No aphasia or dysarthria   Positive tinnel sign on left hand   Psychiatric:         Mood and Affect: Mood normal.         Behavior: Behavior normal.             Significant Labs: All pertinent labs within the past 24 hours have been reviewed.    Significant Imaging: I have reviewed all  pertinent imaging results/findings within the past 24 hours.

## 2023-12-26 NOTE — PROGRESS NOTES
[facility]  Obstetrics  Postpartum Progress Note    Patient Name: Yana Richards  MRN: [id number]  Admission Date: 2023  Hospital Length of Stay: 3 days  Attending Physician: Ann Moore MD  Primary Care Provider: Michelle Doherty APRN-CNP    Subjective:     Principal Problem:Edema during pregnancy in third trimester    Hospital Course:  No notes on file    Interval History: POD #2  S/p C/S, IDDM on insulin pump  Regular diet, no nausea or vomiting.  Passed gas  Using breast pump since baby NICU    Objective:     Vital Signs (Most Recent):  Temp: 98.1 °F (36.7 °C) (23)  Pulse: 100 (23)  Resp: 18 (23)  BP: 138/88 (23)  SpO2: 98 % (23) Vital Signs (24h Range):  Temp:  [97.8 °F (36.6 °C)-98.3 °F (36.8 °C)] 98.1 °F (36.7 °C)  Pulse:  [] 100  Resp:  [12-24] 18  SpO2:  [88 %-98 %] 98 %  BP: (112-138)/(66-93) 138/88     Weight: 98.9 kg (218 lb)  Body mass index is 39.87 kg/m².      Intake/Output Summary (Last 24 hours) at 2023 0738  Last data filed at 2023 0000  Gross per 24 hour   Intake 172.08 ml   Output 1000 ml   Net -827.92 ml         Significant Labs:  Lab Results   Component Value Date    GROUPTRH B POS 2023     Recent Labs   Lab 23  0008   HGB 8.7*   HCT 27.3*       I have personallly reviewed all pertinent lab results from the last 24 hours.    Physical Exam    Review of Systems  Assessment/Plan:     23 y.o. female  for:    * Edema during pregnancy in third trimester  Pod 1.-status post P-xalpeop-lbggw well  Type 1 diabetes-well controlled overnight   Likely preeclampsia-good diuresis with approximately 2000 cc since 8:00 p.m. yesterday , no neurologic complaints  Routine advances orders-will discontinue magnesium and transfer patient to postpartum floor          Disposition: As patient meets milestones, will plan to discharge POD #4 since baby NICU.    Kiera Hudson MD  Obstetrics  [facility]  [facility]

## 2023-12-26 NOTE — PROGRESS NOTES
[facility] Medicine  Progress Note    Patient Name: Yana Richards  MRN: [id number]  Patient Class: IP- Inpatient   Admission Date: 2023  Length of Stay: 3 days  Attending Physician: Ann Moore MD  Primary Care Provider: Michelle Doherty APRN-CNP        Subjective:     Principal Problem:Edema during pregnancy in third trimester        HPI:  Yana Richards is a 24 yo  32w3d w/pmhx of T1DM on insulin pump who was admitted to the L&D floor w/pre-eclampsia and monitoring. Today, she began having a headache. A rapid response was then later called when she began having dysarthria and R hand numbness. By my arrival, patient's dysarthria had resolved. Her numbness had also improved. Upon my exam, CN 2-12 were grossly intact, sensation intact in all extremities, 4/5 strength in all extremities. Sugar 210. Her insulin pump had been removed briefly after it came off during changing and was off for about 3 hours. Systolic BP in the 140s. Tele-stroke was called to evaluate due to patient's risk of radiation w/CT head and recommended deferring further imaging for now as symptoms had improved. In addition, symptoms were thought to be more from migraine w/aura vs pre-eclampsia. Hospital medicine consulted for diabetes management. Patient will have  today and will be transferred to the ICU afterwards.     Overview/Hospital Course:      Interval History: Patient POD2. Sugars reviewed on dexcom and <250 w/last 24 hours. Feeling well. Tolerating diet.     Review of Systems   All other systems reviewed and are negative.    Objective:     Vital Signs (Most Recent):  Temp: 98.1 °F (36.7 °C) (23 1120)  Pulse: 105 (23 1120)  Resp: 18 (23 1432)  BP: (!) 153/88 (notified nurse) (23 1120)  SpO2: 96 % (23 1120) Vital Signs (24h Range):  Temp:  [97.8 °F (36.6 °C)-98.1 °F (36.7 °C)] 98.1 °F (36.7 °C)  Pulse:  [] 105  Resp:  [17-20] 18  SpO2:  [91 %-99 %] 96 %  BP:  (122-153)/(76-93) 153/88     Weight: 98.9 kg (218 lb)  Body mass index is 39.87 kg/m².    Intake/Output Summary (Last 24 hours) at 2023 1510  Last data filed at 2023 0000  Gross per 24 hour   Intake --   Output 500 ml   Net -500 ml           Physical Exam  Constitutional:       Appearance: She is obese.   HENT:      Head: Normocephalic and atraumatic.      Right Ear: External ear normal.      Left Ear: External ear normal.      Nose: Nose normal.   Eyes:      Extraocular Movements: Extraocular movements intact.   Cardiovascular:      Rate and Rhythm: Normal rate and regular rhythm.   Pulmonary:      Effort: Pulmonary effort is normal. No respiratory distress.      Breath sounds: No wheezing.      Comments: Off oxygen   Abdominal:      General: Bowel sounds are normal.      Comments: Abdominal binder in place   Musculoskeletal:         General: Swelling present.   Skin:     General: Skin is warm.   Neurological:      General: No focal deficit present.      Mental Status: She is alert.      Comments: No facial numbness. No hand numbness. 5/5 strength in all extremities. No aphasia or dysarthria   Positive tinnel sign on left hand   Psychiatric:         Mood and Affect: Mood normal.         Behavior: Behavior normal.             Significant Labs: All pertinent labs within the past 24 hours have been reviewed.    Significant Imaging: I have reviewed all pertinent imaging results/findings within the past 24 hours.    Assessment/Plan:      * Edema during pregnancy in third trimester  Likely 2/2 pre-eclampsia. Improving        32 weeks gestation of pregnancy  -care per ob  -s/p  on       Migraine without status migrainosus, not intractable  Evaluated by tele-stroke, numbness/dysarthria may be 2/2 migraine   -defer further imaging for now as symptoms have resolved   -positive tinel's sign on left hand, patient may also have some carpal tunnel dz      Type 1 diabetes mellitus with  hyperglycemia  -wears insulin pump at home   -continue insulin pump  -no need for insulin gtt   -accuchecks ACHS        VTE Risk Mitigation (From admission, onward)           Ordered     IP VTE HIGH RISK PATIENT  Once         12/24/23 2242     Place sequential compression device  Until discontinued         12/24/23 2242                    Discharge Planning   OSCAR:      Code Status: Full Code   Is the patient medically ready for discharge?:     Reason for patient still in hospital (select all that apply): Treatment                     Kalia Meeks MD  Department of Hospital Medicine   [facility]

## 2023-12-26 NOTE — ASSESSMENT & PLAN NOTE
-wears insulin pump at home   -continue insulin pump  -no need for insulin gtt   -gisseluchmichelle BARR

## 2023-12-26 NOTE — SUBJECTIVE & OBJECTIVE
Interval History: POD #2  S/p C/S, IDDM on insulin pump  Regular diet, no nausea or vomiting.  Passed gas  Using breast pump since baby NICU    Objective:     Vital Signs (Most Recent):  Temp: 98.1 °F (36.7 °C) (12/26/23 0525)  Pulse: 100 (12/26/23 0525)  Resp: 18 (12/26/23 0525)  BP: 138/88 (12/26/23 0525)  SpO2: 98 % (12/26/23 0525) Vital Signs (24h Range):  Temp:  [97.8 °F (36.6 °C)-98.3 °F (36.8 °C)] 98.1 °F (36.7 °C)  Pulse:  [] 100  Resp:  [12-24] 18  SpO2:  [88 %-98 %] 98 %  BP: (112-138)/(66-93) 138/88     Weight: 98.9 kg (218 lb)  Body mass index is 39.87 kg/m².      Intake/Output Summary (Last 24 hours) at 12/26/2023 0738  Last data filed at 12/26/2023 0000  Gross per 24 hour   Intake 172.08 ml   Output 1000 ml   Net -827.92 ml         Significant Labs:  Lab Results   Component Value Date    GROUPTRH B POS 12/23/2023     Recent Labs   Lab 12/26/23  0008   HGB 8.7*   HCT 27.3*       I have personallly reviewed all pertinent lab results from the last 24 hours.    Physical Exam    Review of Systems

## 2023-12-27 LAB — GLUCOSE SERPL-MCNC: 77 MG/DL (ref 70–110)

## 2023-12-27 PROCEDURE — 25000003 PHARM REV CODE 250: Performed by: OBSTETRICS & GYNECOLOGY

## 2023-12-27 PROCEDURE — 63600175 PHARM REV CODE 636 W HCPCS: Performed by: OBSTETRICS & GYNECOLOGY

## 2023-12-27 PROCEDURE — 25000003 PHARM REV CODE 250: Performed by: SPECIALIST

## 2023-12-27 PROCEDURE — 12000002 HC ACUTE/MED SURGE SEMI-PRIVATE ROOM

## 2023-12-27 RX ORDER — ONDANSETRON 4 MG/1
8 TABLET, ORALLY DISINTEGRATING ORAL EVERY 6 HOURS PRN
Status: DISCONTINUED | OUTPATIENT
Start: 2023-12-27 | End: 2023-12-29 | Stop reason: HOSPADM

## 2023-12-27 RX ORDER — FUROSEMIDE 10 MG/ML
40 INJECTION INTRAMUSCULAR; INTRAVENOUS
Status: COMPLETED | OUTPATIENT
Start: 2023-12-27 | End: 2023-12-28

## 2023-12-27 RX ADMIN — IBUPROFEN 800 MG: 400 TABLET, FILM COATED ORAL at 12:12

## 2023-12-27 RX ADMIN — IBUPROFEN 800 MG: 400 TABLET, FILM COATED ORAL at 06:12

## 2023-12-27 RX ADMIN — IBUPROFEN 800 MG: 400 TABLET, FILM COATED ORAL at 10:12

## 2023-12-27 RX ADMIN — OXYCODONE HYDROCHLORIDE AND ACETAMINOPHEN 1 TABLET: 10; 325 TABLET ORAL at 04:12

## 2023-12-27 RX ADMIN — PANTOPRAZOLE SODIUM 40 MG: 40 TABLET, DELAYED RELEASE ORAL at 06:12

## 2023-12-27 RX ADMIN — OXYCODONE HYDROCHLORIDE AND ACETAMINOPHEN 1 TABLET: 5; 325 TABLET ORAL at 06:12

## 2023-12-27 RX ADMIN — PRENATAL VIT W/ FE FUMARATE-FA TAB 27-0.8 MG 1 TABLET: 27-0.8 TAB at 10:12

## 2023-12-27 RX ADMIN — DOCUSATE SODIUM 200 MG: 100 CAPSULE, LIQUID FILLED ORAL at 11:12

## 2023-12-27 RX ADMIN — DOCUSATE SODIUM 200 MG: 100 CAPSULE, LIQUID FILLED ORAL at 10:12

## 2023-12-27 RX ADMIN — OXYCODONE HYDROCHLORIDE AND ACETAMINOPHEN 1 TABLET: 10; 325 TABLET ORAL at 11:12

## 2023-12-27 RX ADMIN — ONDANSETRON 8 MG: 4 TABLET, ORALLY DISINTEGRATING ORAL at 11:12

## 2023-12-27 RX ADMIN — FUROSEMIDE 40 MG: 10 INJECTION, SOLUTION INTRAVENOUS at 01:12

## 2023-12-27 RX ADMIN — OXYCODONE HYDROCHLORIDE AND ACETAMINOPHEN 1 TABLET: 5; 325 TABLET ORAL at 12:12

## 2023-12-27 NOTE — NURSING
Patient complained of  left lower leg pain, swelling, Dr Remy notified , new order for left lower extremity doppler.

## 2023-12-27 NOTE — SUBJECTIVE & OBJECTIVE
Interval History: Patient POD3. Sugars controlled per dexcom. Per patient, all <200. Reports numbness in tongue and hand has resolved.     Review of Systems   All other systems reviewed and are negative.    Objective:     Vital Signs (Most Recent):  Temp: 98.1 °F (36.7 °C) (12/27/23 1120)  Pulse: 104 (12/27/23 1120)  Resp: 16 (12/27/23 1222)  BP: (!) 151/72 (12/27/23 1120)  SpO2: 95 % (12/27/23 1120) Vital Signs (24h Range):  Temp:  [98 °F (36.7 °C)-98.8 °F (37.1 °C)] 98.1 °F (36.7 °C)  Pulse:  [] 104  Resp:  [16-18] 16  SpO2:  [95 %-98 %] 95 %  BP: (123-151)/(72-87) 151/72     Weight: 98.9 kg (218 lb)  Body mass index is 39.87 kg/m².    Intake/Output Summary (Last 24 hours) at 12/27/2023 1417  Last data filed at 12/27/2023 1414  Gross per 24 hour   Intake --   Output 1900 ml   Net -1900 ml           Physical Exam  Constitutional:       Appearance: She is obese.   HENT:      Head: Normocephalic and atraumatic.      Right Ear: External ear normal.      Left Ear: External ear normal.      Nose: Nose normal.   Eyes:      Extraocular Movements: Extraocular movements intact.   Cardiovascular:      Rate and Rhythm: Normal rate and regular rhythm.   Pulmonary:      Effort: Pulmonary effort is normal. No respiratory distress.      Breath sounds: No wheezing.      Comments: Off oxygen   Abdominal:      General: Bowel sounds are normal.      Comments: Abdominal binder in place   Musculoskeletal:         General: Swelling present.   Skin:     General: Skin is warm.   Neurological:      General: No focal deficit present.      Mental Status: She is alert.      Comments: No facial numbness. No hand numbness. 5/5 strength in all extremities. No aphasia or dysarthria   Positive tinnel sign on left hand   Psychiatric:         Mood and Affect: Mood normal.         Behavior: Behavior normal.             Significant Labs: All pertinent labs within the past 24 hours have been reviewed.    Significant Imaging: I have reviewed all  pertinent imaging results/findings within the past 24 hours.

## 2023-12-27 NOTE — PROGRESS NOTES
[facility] Medicine  Progress Note    Patient Name: Yana Richards  MRN: [id number]  Patient Class: IP- Inpatient   Admission Date: 2023  Length of Stay: 4 days  Attending Physician: Ann Moore MD  Primary Care Provider: Michelle Doherty APRN-CNP        Subjective:     Principal Problem:Edema during pregnancy in third trimester        HPI:  Yana Richards is a 22 yo  32w3d w/pmhx of T1DM on insulin pump who was admitted to the L&D floor w/pre-eclampsia and monitoring. Today, she began having a headache. A rapid response was then later called when she began having dysarthria and R hand numbness. By my arrival, patient's dysarthria had resolved. Her numbness had also improved. Upon my exam, CN 2-12 were grossly intact, sensation intact in all extremities, 4/5 strength in all extremities. Sugar 210. Her insulin pump had been removed briefly after it came off during changing and was off for about 3 hours. Systolic BP in the 140s. Tele-stroke was called to evaluate due to patient's risk of radiation w/CT head and recommended deferring further imaging for now as symptoms had improved. In addition, symptoms were thought to be more from migraine w/aura vs pre-eclampsia. Hospital medicine consulted for diabetes management. Patient will have  today and will be transferred to the ICU afterwards.     Overview/Hospital Course:      Interval History: Patient POD3. Sugars controlled per dexcom. Per patient, all <200. Reports numbness in tongue and hand has resolved.     Review of Systems   All other systems reviewed and are negative.    Objective:     Vital Signs (Most Recent):  Temp: 98.1 °F (36.7 °C) (23 1120)  Pulse: 104 (23 1120)  Resp: 16 (23 1222)  BP: (!) 151/72 (23 1120)  SpO2: 95 % (23 1120) Vital Signs (24h Range):  Temp:  [98 °F (36.7 °C)-98.8 °F (37.1 °C)] 98.1 °F (36.7 °C)  Pulse:  [] 104  Resp:  [16-18] 16  SpO2:  [95 %-98 %] 95 %  BP:  (123-151)/(72-87) 151/72     Weight: 98.9 kg (218 lb)  Body mass index is 39.87 kg/m².    Intake/Output Summary (Last 24 hours) at 2023 1417  Last data filed at 2023 1414  Gross per 24 hour   Intake --   Output 1900 ml   Net -1900 ml           Physical Exam  Constitutional:       Appearance: She is obese.   HENT:      Head: Normocephalic and atraumatic.      Right Ear: External ear normal.      Left Ear: External ear normal.      Nose: Nose normal.   Eyes:      Extraocular Movements: Extraocular movements intact.   Cardiovascular:      Rate and Rhythm: Normal rate and regular rhythm.   Pulmonary:      Effort: Pulmonary effort is normal. No respiratory distress.      Breath sounds: No wheezing.      Comments: Off oxygen   Abdominal:      General: Bowel sounds are normal.      Comments: Abdominal binder in place   Musculoskeletal:         General: Swelling present.   Skin:     General: Skin is warm.   Neurological:      General: No focal deficit present.      Mental Status: She is alert.      Comments: No facial numbness. No hand numbness. 5/5 strength in all extremities. No aphasia or dysarthria   Positive tinnel sign on left hand   Psychiatric:         Mood and Affect: Mood normal.         Behavior: Behavior normal.             Significant Labs: All pertinent labs within the past 24 hours have been reviewed.    Significant Imaging: I have reviewed all pertinent imaging results/findings within the past 24 hours.    Assessment/Plan:      * Edema during pregnancy in third trimester  Likely 2/2 pre-eclampsia. DVT US negative       32 weeks gestation of pregnancy  -care per ob  -s/p  on   -POD3      Migraine without status migrainosus, not intractable  Evaluated by tele-stroke, numbness/dysarthria may be 2/2 migraine   -defer further imaging for now as symptoms have resolved   -positive tinel's sign on left hand, patient may also have some carpal tunnel dz  -may benefit from outpatient NSGY  eval      Type 1 diabetes mellitus with hyperglycemia  Sugars controlled per patient's monitor   -wears insulin pump at home   -continue insulin pump  -no need for insulin gtt   -accuchecks ACHS        VTE Risk Mitigation (From admission, onward)           Ordered     IP VTE HIGH RISK PATIENT  Once         12/24/23 2242     Place sequential compression device  Until discontinued         12/24/23 2242                    Discharge Planning   OSCAR:      Code Status: Full Code   Is the patient medically ready for discharge?:     Reason for patient still in hospital (select all that apply): Patient trending condition  Discharge Plan A: Home                  Kalia Meeks MD  Department of Hospital Medicine   [facility]

## 2023-12-27 NOTE — ASSESSMENT & PLAN NOTE
S/p primary c/section POD#3    IDDM - sugars 140-150 postprandial.  Pt managing with Dexcom and insulin pump  Preeclampsia - BP's good, edema is somewhat better, but still prohibiting easy ambulation.  Will give lasix 40 mg now and repeat in 12 hours  Continue post op care  Encouraged ambulation

## 2023-12-27 NOTE — ASSESSMENT & PLAN NOTE
Evaluated by tele-stroke, numbness/dysarthria may be 2/2 migraine   -defer further imaging for now as symptoms have resolved   -positive tinel's sign on left hand, patient may also have some carpal tunnel dz  -may benefit from outpatient NSGY eval

## 2023-12-27 NOTE — ASSESSMENT & PLAN NOTE
Sugars controlled per patient's monitor   -wears insulin pump at home   -continue insulin pump  -no need for insulin gtt   -chrissy BARR

## 2023-12-27 NOTE — PROGRESS NOTES
[facility]  Obstetrics  Postpartum Progress Note    Patient Name: Yana Richards  MRN: [id number]  Admission Date: 12/23/2023  Hospital Length of Stay: 4 days  Attending Physician: Ann Moore MD  Primary Care Provider: Michelle Doherty APRN-CNP    Subjective:     Principal Problem:Edema during pregnancy in third trimester    Hospital Course:  No notes on file    Interval History: s/p primary c/section POD#3    She is doing well this morning. She is tolerating a regular diet without nausea or vomiting. She is voiding spontaneously. She is ambulating. She has passed flatus, and has not a BM. Vaginal bleeding is mild. She denies fever or chills. Abdominal pain is mild and controlled with oral medications. She Is breastfeeding. She desires circumcision for her male baby: yes.    Baby is doing well in the NICU    Pt continues to c/o massive swelling of the lower extremity causing difficulty walking.      Objective:     Vital Signs (Most Recent):  Temp: 98.1 °F (36.7 °C) (12/27/23 1120)  Pulse: 104 (12/27/23 1120)  Resp: 16 (12/27/23 1222)  BP: (!) 151/72 (12/27/23 1120)  SpO2: 95 % (12/27/23 1120) Vital Signs (24h Range):  Temp:  [98 °F (36.7 °C)-98.8 °F (37.1 °C)] 98.1 °F (36.7 °C)  Pulse:  [] 104  Resp:  [16-18] 16  SpO2:  [95 %-98 %] 95 %  BP: (123-151)/(72-87) 151/72     Weight: 98.9 kg (218 lb)  Body mass index is 39.87 kg/m².      Intake/Output Summary (Last 24 hours) at 12/27/2023 1313  Last data filed at 12/26/2023 2250  Gross per 24 hour   Intake --   Output 1100 ml   Net -1100 ml         Significant Labs:  Lab Results   Component Value Date    GROUPTRH B POS 12/23/2023     Recent Labs   Lab 12/26/23  0008   HGB 8.7*   HCT 27.3*       I have personallly reviewed all pertinent lab results from the last 24 hours.    Physical Exam  Gen - NAD  Uterus - firm below umbilicus, non tender  Ext - 2+ edema up to mid thigh, mons and lower pannus, no calf tenderness  Incision - mepilex in place, dry      Review of Systems  Assessment/Plan:     23 y.o. female  for:    * Edema during pregnancy in third trimester  S/p primary c/section POD#3    IDDM - sugars 140-150 postprandial.  Pt managing with Dexcom and insulin pump  Preeclampsia - BP's good, edema is somewhat better, but still prohibiting easy ambulation.  Will give lasix 40 mg now and repeat in 12 hours  Continue post op care  Encouraged ambulation        .    Ann Moore MD  Obstetrics  On license of [facility]

## 2023-12-27 NOTE — LACTATION NOTE
12/27/23 1330   Equipment Type   Breast Pump Type double electric, hospital grade   Breast Pump Flange Type hard   Breast Pump Flange Size 21 mm   Breast Pumping   Breast Pumping Interventions frequent pumping encouraged   Breast Pumping double electric breast pump utilized     Pt reports that she has not pumped since about 6 am this morning due to being a pain. Discussed the importance of pumping a minium of 8 times in 24 hours in order to get a good milk supply. Reinforced supply & demand. Pt also reports that her significant other has been helping her with washing & sanitizing her pump parts. Pt denies any questions/concerns or assistance @ this time. Assistance offered prn. Mom verbalized understanding

## 2023-12-27 NOTE — SUBJECTIVE & OBJECTIVE
Interval History: s/p primary c/section POD#3    She is doing well this morning. She is tolerating a regular diet without nausea or vomiting. She is voiding spontaneously. She is ambulating. She has passed flatus, and has not a BM. Vaginal bleeding is mild. She denies fever or chills. Abdominal pain is mild and controlled with oral medications. She Is breastfeeding. She desires circumcision for her male baby: yes.    Baby is doing well in the NICU    Pt continues to c/o massive swelling of the lower extremity causing difficulty walking.      Objective:     Vital Signs (Most Recent):  Temp: 98.1 °F (36.7 °C) (12/27/23 1120)  Pulse: 104 (12/27/23 1120)  Resp: 16 (12/27/23 1222)  BP: (!) 151/72 (12/27/23 1120)  SpO2: 95 % (12/27/23 1120) Vital Signs (24h Range):  Temp:  [98 °F (36.7 °C)-98.8 °F (37.1 °C)] 98.1 °F (36.7 °C)  Pulse:  [] 104  Resp:  [16-18] 16  SpO2:  [95 %-98 %] 95 %  BP: (123-151)/(72-87) 151/72     Weight: 98.9 kg (218 lb)  Body mass index is 39.87 kg/m².      Intake/Output Summary (Last 24 hours) at 12/27/2023 1313  Last data filed at 12/26/2023 2250  Gross per 24 hour   Intake --   Output 1100 ml   Net -1100 ml         Significant Labs:  Lab Results   Component Value Date    GROUPTRH B POS 12/23/2023     Recent Labs   Lab 12/26/23  0008   HGB 8.7*   HCT 27.3*       I have personallly reviewed all pertinent lab results from the last 24 hours.    Physical Exam  Gen - NAD  Uterus - firm below umbilicus, non tender  Ext - 2+ edema up to mid thigh, mons and lower pannus, no calf tenderness  Incision - mepilex in place, dry     Review of Systems

## 2023-12-27 NOTE — LACTATION NOTE
12/26/23 1330   Maternal Assessment   Breast Density Bilateral:;soft   Areola Bilateral:;elastic   Nipples Bilateral:;everted   Equipment Type   Breast Pump Type double electric, hospital grade   Breast Pump Flange Type hard   Breast Pump Flange Size 21 mm   Breast Pumping   Breast Pumping Interventions frequent pumping encouraged   Breast Pumping double electric breast pump utilized     Reviewed NICU pumping instructions, cleaning and usage of pump. Provided 21 mm flanges, and assisted with pumping. Collected drops of colostrum with cotton swab for baby in NICU. Parts sterilized after pumping. Emphasized importance of pumping at least eight times in 24 hours to stimulate adequate milk production. Encouraged to call me for any further assistance with pumping. Patient verbalizes understanding of all instructions with good recall.

## 2023-12-28 VITALS
HEART RATE: 76 BPM | RESPIRATION RATE: 18 BRPM | HEIGHT: 62 IN | SYSTOLIC BLOOD PRESSURE: 137 MMHG | TEMPERATURE: 98 F | BODY MASS INDEX: 40.12 KG/M2 | DIASTOLIC BLOOD PRESSURE: 94 MMHG | WEIGHT: 218 LBS | OXYGEN SATURATION: 91 %

## 2023-12-28 PROCEDURE — 25000003 PHARM REV CODE 250: Performed by: SPECIALIST

## 2023-12-28 PROCEDURE — 12000002 HC ACUTE/MED SURGE SEMI-PRIVATE ROOM

## 2023-12-28 PROCEDURE — 63600175 PHARM REV CODE 636 W HCPCS: Performed by: OBSTETRICS & GYNECOLOGY

## 2023-12-28 RX ORDER — OXYCODONE AND ACETAMINOPHEN 5; 325 MG/1; MG/1
1-2 TABLET ORAL EVERY 4 HOURS PRN
Qty: 30 TABLET | Refills: 0 | Status: SHIPPED | OUTPATIENT
Start: 2023-12-28

## 2023-12-28 RX ORDER — IBUPROFEN 600 MG/1
600 TABLET ORAL EVERY 6 HOURS PRN
Qty: 30 TABLET | Refills: 0 | Status: SHIPPED | OUTPATIENT
Start: 2023-12-28

## 2023-12-28 RX ADMIN — IBUPROFEN 800 MG: 400 TABLET, FILM COATED ORAL at 11:12

## 2023-12-28 RX ADMIN — PANTOPRAZOLE SODIUM 40 MG: 40 TABLET, DELAYED RELEASE ORAL at 10:12

## 2023-12-28 RX ADMIN — DOCUSATE SODIUM 200 MG: 100 CAPSULE, LIQUID FILLED ORAL at 10:12

## 2023-12-28 RX ADMIN — FUROSEMIDE 40 MG: 10 INJECTION, SOLUTION INTRAVENOUS at 01:12

## 2023-12-28 RX ADMIN — PRENATAL VIT W/ FE FUMARATE-FA TAB 27-0.8 MG 1 TABLET: 27-0.8 TAB at 10:12

## 2023-12-28 RX ADMIN — IBUPROFEN 800 MG: 400 TABLET, FILM COATED ORAL at 01:12

## 2023-12-28 RX ADMIN — OXYCODONE HYDROCHLORIDE AND ACETAMINOPHEN 1 TABLET: 5; 325 TABLET ORAL at 11:12

## 2023-12-28 NOTE — DISCHARGE SUMMARY
[facility]  Discharge Summary  Obstetrics - Triage      Admit Date: 2023    Discharge Date and Time:  2023 1:24 PM    Attending Physician: Ann Moore MD     Discharge Provider: Ann Moore    Reason for Admission:  IUP at 32 wga, IDDM, preeclampsia with neurologic symptoms, severe edema    Hospital Course (synopsis of major diagnoses, care, treatment, and services provided during the course of the hospital stay):     Yana Richards is a 23 y.o.  female who presented to labor and delivery at 32 wga with IDDM, massive swelling, and neurologic symptoms.  She had stroke evaluation which was negative.  Delivery was expedited due to the neurologic symptoms.  She had a primary  section without complication.  Baby of course went to the NICU, but is doing quite well.  Her postop glycemic control was for the most part good, and her neurologic symptoms resolved.  On postop day 3 she still had massive edema of the lower extremity up to her pubic area and she was given 2 doses of Lasix which resulted in excellent diuresis.  On postop day 4,  She has tolerating a regular diet, passing gas, her pain is well controlled with oral medication, vaginal bleeding is minimal, blood pressures have been normal to mildly elevated, and she is ready for discharge.  She will be discharged with written instructions, prescription for pain medicine, and she will follow up with me in 1 week..     Gen - NAD  Uterus - firm below umbilicus, non tender  Ext - 1+ edema bilateral lower extremity, no calf tenderness  Incision - mepilex in place, dry     She was admitted to the labor and delivery triage area. Vital signs on admit were: Temp: 98.7 °F (37.1 °C), Pulse: 103, Resp: 18, BP: 137/85, SpO2: 95 %.      Final Diagnoses:    Principal Problem: Edema during pregnancy in third trimester   Secondary Diagnoses:  insulin dependent diabetes    Discharged Condition: good    Disposition: Home or Self  Care    Follow Up/Patient Instructions:     Medications:  Reconciled Home Medications:      Medication List        START taking these medications      ibuprofen 600 MG tablet  Commonly known as: ADVIL,MOTRIN  Take 1 tablet (600 mg total) by mouth every 6 (six) hours as needed.     oxyCODONE-acetaminophen 5-325 mg per tablet  Commonly known as: PERCOCET  Take 1-2 tablets by mouth every 4 (four) hours as needed for Pain.            CONTINUE taking these medications      BAQSIMI 3 mg/actuation Spry  Generic drug: glucagon  Spray 3 mg into one nostril. Repeat in opposite nostril if no response in 15 min.     CONTOUR NEXT TEST STRIPS Strp  Generic drug: blood sugar diagnostic  Use four times daily.     DEXCOM G7 SENSOR Karly  Generic drug: blood-glucose sensor  Change every 10 days.     insulin glargine 100 units/mL SubQ pen  Commonly known as: LANTUS SOLOSTAR U-100 INSULIN  Inject 25 units into the skin daily in case of pump malfunction.     insulin lispro 100 unit/mL injection  Use w/ insulin pump. MDD: 80 units     ondansetron 4 MG Tbdl  Commonly known as: ZOFRAN-ODT  Take 1 tablet (4 mg total) by mouth every 6 (six) hours as needed (nausea).     PRENATAL 1+1 ORAL  Take by mouth.            Discharge Procedure Orders   BREAST PUMP FOR HOME USE     Order Specific Question Answer Comments   Type of pump: Electric    Weight: 98.9 kg (218 lb)    Length of need (1-99 months): 99      Diet Adult Regular     Lifting restrictions   Scheduling Instructions: No lifting greater than 10 # for 6 weeks     No driving until:   Order Comments: No driving for 2 weeks      Follow-up Information       Ann Moore MD. Schedule an appointment as soon as possible for a visit in 1 week(s).    Specialties: Obstetrics, Obstetrics and Gynecology  Why: For follow up  Contact information:  [address]  [address]  [facility]  [place] [address]  [phone]

## 2023-12-28 NOTE — DISCHARGE INSTRUCTIONS
Pelvic rest for 6 weeks (no sex, tampons, douching, nothing in the vagina)   You can experience vaginal bleeding on and off for up to 6 weeks, it will gradually get lighter and the color will change from bright red to a brownish discharge towards the end.      Activity:   NO strenuous activities or exercising for 6 weeks. Do not /lift anything over 15 pounds and no heavy housework or cleaning for 6 weeks. Limit stair climbing to twice a day during the first 2 weeks.   NO driving for 4 weeks. You may take short car trips but do not drive.   You may shower ONLY for the first 2 weeks, after 2 weeks you can soak in a bathtub. Use a mild soap, no heavy perfumes or fragrances to avoid irritation.   Walking frequently following a  delivery promotes healing and decreases pain associated with gas.   If constipation develops: You may take Colace (stool softener), Milk of Magnesia, Dulcolax or Miralax. All of these medications are sold over the counter.   Incision Care:   Clean your incision with mild soap & warm water only- do not scrub- let warm water run over it, then pat dry.      Pain Relief:   You may take Motrin for mild pain & uterine cramping.      Emotional Changes:   You may experience baby blues after delivery. You may feel let down, anxious and cry easily. This is normal. These feelings can begin 2-3 days after delivery and usually disappear in about a week or two. Prolonged sadness may indicate postpartum depression.     Call your doctor for any of the following:   Difficulty breathing, problems with any of your medications, inability to eat.   Foul smelling vaginal discharge.   If you notice pus-like drainage from your incision, if your incision or the area around it becomes hot or swollen, or if you notice a foul smelling odor.   Temperature above 100.4.   Heavy vaginal bleeding. All women bleed different after delivery and each delivery is different. Heavy bleeding consists of saturating a  malka pad in a 1 hour time period. Passing clots are normal, if you pass a blood clot larger than the size of a golf ball call your doctor's office.   If you experience pain in your legs/calves, if one leg increases in size and becomes swollen or becomes hot to touch or discolored.   Crying or periods of sadness beyond 2 weeks.     If you are breast feeding:   Wash your breasts with mild soap and warm water.   You should wear a supportive bra.   You should continue to take a prenatal vitamin for 6 weeks or until breastfeeding is discontinued.   If nipples are sore, apply a few drops of breast milk after nursing and let air dry or you can use Lanolin cream.   If breasts are engorged, apply warmth and express milk.  [facility] lactation consultant is available at [phone] for breastfeeding assisstance    If you are not breastfeeding:   Wear a tight bra and do not stimulate your breasts. Avoid handling your breasts and do not express milk. You may apply ice packs or cabbage leaves to relieve discomfort from engorgement. If your breasts become warm to touch, reddened or lumps develop call your doctor.

## 2023-12-29 NOTE — LACTATION NOTE
12/28/23 1630   Maternal Assessment   Breast Density Bilateral:;filling   Areola Bilateral:;elastic   Nipples Bilateral:;everted   Equipment Type   Breast Pump Type double electric, hospital grade   Breast Pump Flange Type hard   Breast Pump Flange Size 21 mm   Breast Pumping   Breast Pumping Interventions frequent pumping encouraged   Breast Pumping double electric breast pump utilized     NICU pumping discharge instructions and engorgement precautions reviewed. Patient provided with Jerrod pump (#[id number]) and pumping supplies. Patient verbalizes understanding of all instructions with good recall

## 2024-01-11 ENCOUNTER — LACTATION ENCOUNTER (OUTPATIENT)
Dept: INTENSIVE CARE | Facility: HOSPITAL | Age: 24
End: 2024-01-11

## 2024-01-11 NOTE — LACTATION NOTE
This note was copied from a baby's chart.     01/11/24 1120   Maternal Assessment   Breast Size Issue none   Breast Shape round   Breast Density filling   Areola elastic   Nipples everted   Maternal Infant Feeding   Maternal Emotional State assist needed   Infant Positioning cradle   Signs of Milk Transfer audible swallow   Pain with Feeding no   Latch Assistance yes     Mom breastfeeding baby right now. Baby came off the breast, assisted mom with getting baby back on. Good nutritive sucking & swallowing noted. Instructed mom to do breast compressions when starts to get sleepy at the breast to help transfer more milk while feeding. Instructed to also to pump after the feeding for extra stimulation. Assistance offered prn. Mom verbalized understanding

## 2024-01-16 ENCOUNTER — LACTATION ENCOUNTER (OUTPATIENT)
Dept: INTENSIVE CARE | Facility: HOSPITAL | Age: 24
End: 2024-01-16

## 2024-01-17 ENCOUNTER — LACTATION ENCOUNTER (OUTPATIENT)
Dept: INTENSIVE CARE | Facility: HOSPITAL | Age: 24
End: 2024-01-17

## 2024-01-17 NOTE — LACTATION NOTE
"This note was copied from a baby's chart.  Assisted to latch baby to left breast in cradle position. Baby latched deeply after several attempts, nursing well with stimulation and continuous compression of milk from breast. Swallows heard. Mother denies pain during feeding. Discussed importance of pumping at least eight times in 24 hours for adequate milk production and suggested "power pumping" once or twice daily to increase supply. Mother verbalizes understanding of all instructions with good recall.  "

## 2024-01-17 NOTE — LACTATION NOTE
This note was copied from a baby's chart.  Assisted mother to breastfeed at bedside in NICU. Assisted to latch baby to right breast in cradle position. Baby latched deeply after several attempts, nursing well with audible swallows for 20 minutes. Provided silicone  for mother to attach to opposite breast during feeding. Encouraged to continue pumping at least 8 times in 24 hours for adequate milk supply. Mother verbalizes understanding of all instructions with good recall.

## 2024-01-18 ENCOUNTER — LACTATION ENCOUNTER (OUTPATIENT)
Dept: INTENSIVE CARE | Facility: HOSPITAL | Age: 24
End: 2024-01-18

## 2024-01-18 NOTE — LACTATION NOTE
This note was copied from a baby's chart.     01/18/24 1710   Maternal Assessment   Breast Size Issue none   Breast Shape round   Breast Density full   Areola elastic   Nipples everted   Maternal Infant Feeding   Maternal Emotional State assist needed   Infant Positioning cross-cradle;cradle   Signs of Milk Transfer audible swallow   Pain with Feeding no   Latch Assistance yes     Assisted with position & latch. Baby very sleepy. Lots of stimulation to get baby to nurse. Baby  on & off for 15 minutes & transferred 9 grams. Mom reports pumping frequently but getting much. Assistance offered prn. Mom verbalized understanding

## 2024-01-19 ENCOUNTER — LACTATION ENCOUNTER (OUTPATIENT)
Dept: INTENSIVE CARE | Facility: HOSPITAL | Age: 24
End: 2024-01-19

## 2024-01-19 NOTE — LACTATION NOTE
This note was copied from a baby's chart.     01/19/24 2674   Maternal Assessment   Breast Size Issue none   Breast Shape round   Breast Density Left:;soft;Right:;filling   Areola elastic   Nipples everted   Maternal Infant Feeding   Maternal Emotional State assist needed   Infant Positioning cross-cradle   Signs of Milk Transfer audible swallow   Pain with Feeding no   Latch Assistance yes     Assisted with position & latch. Baby fussy at the breast. Baby keeps pushing the nipple out of his mouth & having re latch baby multiple times. Not very many swallows noted. Lots of breast compression done when baby would stay on the breast. Baby breast fed on & off for total of  20 minutes. Mom reports pumping every 3 hours. Last pumping was at 2 pm. Encouraged mom to pump once she get home for extra stimulation. Mom verbalized understanding

## 2024-03-15 NOTE — PLAN OF CARE
12/28/23 1357   Final Note   Assessment Type Final Discharge Note   Anticipated Discharge Disposition Home   What phone number can be called within the next 1-3 days to see how you are doing after discharge? [phone]   Post-Acute Status   Discharge Delays None known at this time     Discharge orders and chart reviewed with no further post-acute discharge needs identified at this time.  At this time, patient is cleared for discharge from Case Management standpoint.       
  Problem: Breastfeeding  Goal: Effective Breastfeeding  Outcome: Ongoing, Progressing     
Modified bed rest, q3 hr accucheck, 24 hour urine in process  
OB Screen completed and infant in NICU at this time.  See infant's assessment for additional information.      [id number]   OB SCREEN   Assessment Type Discharge Planning Assessment   Source of Information patient;health record   Received Prenatal Care Yes   Any indications/suspicions for None   Is this a teen pregnancy No   Is the baby in NICU Yes   Indication for adoption/Safe Haven No   Indication for DME/post-acute needs No   HIV (+) No   Any congenital  disorders No   Fetal demise/ death No         
Plan of care reviewed with patient. Patient/family voiced understanding.  
Walker

## 2024-07-25 ENCOUNTER — TELEPHONE (OUTPATIENT)
Dept: FAMILY MEDICINE | Facility: CLINIC | Age: 24
End: 2024-07-25
Payer: OTHER GOVERNMENT

## 2024-07-25 DIAGNOSIS — Z01.419 WELL FEMALE EXAM WITH ROUTINE GYNECOLOGICAL EXAM: Primary | ICD-10-CM

## 2024-07-25 NOTE — TELEPHONE ENCOUNTER
----- Message from Michelle Araya sent at 2024  1:52 PM CDT -----  Vm- pt needs a new referral to her gyn as her has    [phone]

## 2024-07-25 NOTE — TELEPHONE ENCOUNTER
Spoke with pt who states she needs a new referral for Dr. Moore for routine check up. Pt has appt scheduled but they told her that her referral .     Referral set up.

## 2024-09-03 ENCOUNTER — TELEPHONE (OUTPATIENT)
Dept: FAMILY MEDICINE | Facility: CLINIC | Age: 24
End: 2024-09-03
Payer: OTHER GOVERNMENT

## 2024-09-03 NOTE — TELEPHONE ENCOUNTER
----- Message from Michelle Araya sent at 9/3/2024  2:37 PM CDT -----  Gabe hammer with [facility] is calling to f/u on the form they sent on the 24th.   [phone] ref# [id number]

## 2024-09-03 NOTE — TELEPHONE ENCOUNTER
Spoke with [facility] they wanted to know if Michelle will sign for pt pump orders. Advised this goes through endo. They voiced understanding.

## 2024-12-09 ENCOUNTER — TELEPHONE (OUTPATIENT)
Dept: FAMILY MEDICINE | Facility: CLINIC | Age: 24
End: 2024-12-09
Payer: OTHER GOVERNMENT

## 2024-12-09 NOTE — TELEPHONE ENCOUNTER
----- Message from Michelle sent at 12/9/2024  9:05 AM CST -----  Gabe cruz with [facility] is calling about the form the sent on [employer]. portal about the dexcom  7   [phone] account number [id number]

## 2025-03-27 NOTE — H&P
NURSING ADMISSION NOTE      Patient admitted via Cart  Oriented to room.  Safety precautions initiated.  Bed in low position.  Call light in reach.  Admission orders initiated.  Pt with cramping pelvic pain, dilaudid given.  IV fluids infusing.     Formerly [facility], [facility]  Obstetrics  History & Physical    Patient Name: Yana Richards  MRN: [id number]  Admission Date: 2023  Primary Care Provider: Michelle Doherty APRN-CNP    Subjective:     Principal Problem:Edema during pregnancy in third trimester    History of Present Illness:  No notes on file    Obstetric HPI:  23-year-old  1 para 0 at 32 weeks and 2 days gestational age with type 1 diabetes presents to Labor and delivery complaining of extreme lower extremity edema.  Patient states has worsened over the last week.  Patient denies any neurologic complaints currently, states active fetal movement.  States blood sugars been well controlled currently is on insulin pump      OB History    Para Term  AB Living   1 0 0 0 0 0   SAB IAB Ectopic Multiple Live Births   0 0 0 0 0      # Outcome Date GA Lbr Ronny/2nd Weight Sex Delivery Anes PTL Lv   1 Current              Past Medical History:   Diagnosis Date    Diabetes mellitus type I      Past Surgical History:   Procedure Laterality Date    main calf muscle removal  2013    TYMPANOSTOMY TUBE PLACEMENT Bilateral 2006       PTA Medications   Medication Sig    CONTOUR NEXT TEST STRIPS Strp Use four times daily.    DEXCOM G7 SENSOR Karly Change every 10 days.    glucagon (BAQSIMI) 3 mg/actuation Spry Spray 3 mg into one nostril. Repeat in opposite nostril if no response in 15 min.    insulin (LANTUS SOLOSTAR U-100 INSULIN) glargine 100 units/mL SubQ pen Inject 25 units into the skin daily in case of pump malfunction.    insulin lispro 100 unit/mL injection Use w/ insulin pump. MDD: 80 units    ondansetron (ZOFRAN-ODT) 4 MG TbDL Take 1 tablet (4 mg total) by mouth every 6 (six) hours as needed (nausea).    prenatal vit/iron fum/folic ac (PRENATAL 1+1 ORAL) Take by mouth.       Review of patient's allergies indicates:   Allergen Reactions    Vancomycin analogues Anaphylaxis        Family History       Problem Relation (Age of  Onset)    Miscarriages / Stillbirths Mother    No Known Problems Father          Tobacco Use    Smoking status: Former     Types: Cigarettes    Smokeless tobacco: Never   Substance and Sexual Activity    Alcohol use: Not Currently     Alcohol/week: 1.0 standard drink of alcohol     Types: 1 Glasses of wine per week    Drug use: Never    Sexual activity: Yes     Review of Systems   Objective:     Vital Signs (Most Recent):  Temp: 98.3 °F (36.8 °C) (12/23/23 1938)  Pulse: 109 (12/23/23 2000)  Resp: (!) 22 (12/23/23 2000)  BP: (!) 141/89 (12/23/23 2000)  SpO2: 98 % (12/23/23 1938) Vital Signs (24h Range):  Temp:  [98.3 °F (36.8 °C)-98.7 °F (37.1 °C)] 98.3 °F (36.8 °C)  Pulse:  [] 109  Resp:  [18-22] 22  SpO2:  [95 %-98 %] 98 %  BP: (121-167)/(68-95) 141/89     Weight: 98.9 kg (218 lb)  Body mass index is 39.87 kg/m².    FHT:  Baseline 130s with good variability  TOCO:  Occasional contractions, patient not feeling     Physical Exam  General-no apparent distress resting comfortably in bed   Abdomen/uterus-gravid nontender with mild edema to the lower abdomen  Extremities-significant pitting lower extremity edema bilaterally.  No erythema or Homans sign, no evidence of DVT.  DTRs normal range     Cervix:  Deferred     Significant Labs:  Lab Results   Component Value Date    [facility] B POS 12/23/2023       CBC:   Recent Labs   Lab 12/23/23  1730   WBC 9.83   RBC 4.00   HGB 10.3*   HCT 32.0*      MCV 80*   MCH 25.8*   MCHC 32.2     CMP:   Recent Labs   Lab 12/23/23  1730   *   CALCIUM 9.0   ALBUMIN 2.9*   PROT 5.5*   *   K 3.7   CO2 23      BUN 8   CREATININE 0.6   ALKPHOS 97   ALT 11   AST 16   BILITOT 0.2     Recent Labs   Lab 12/23/23  1710   COLORU Yellow   SPECGRAV 1.020   PHUR 6.0   PROTEINUA 2+*   BACTERIA Occasional   NITRITE Negative   LEUKOCYTESUR 2+*   UROBILINOGEN Negative   HYALINECASTS 60*     I have personallly reviewed all pertinent lab results from the last 24  hours.  Assessment/Plan:     23 y.o. female  at 32w2d for:    * Edema during pregnancy in third trimester  IUP at 32 weeks and 2 days gestational age     Type 1 diabetes-good control recently with insulin pump    Lower extremity edema-no evidence of DVT, DTRs normal range    Labile blood pressure-no neuro complaints, 24 hour urine in progress.  LFTs and platelets normal range.  Patient had 24 hour urine completed recently-2023 equaled 290 mg.    Ultrasound on admit-vertex position, ERIN 26, 2803 g estimated fetal weight,    Patient is scheduled for elective  on 2024 with primary OB, Dr. Oscar Connolly MD  Obstetrics  formerly [facility]

## (undated) DEVICE — TAPE CLOTH 3 MEDIPORE 2863

## (undated) DEVICE — Device

## (undated) DEVICE — PAD ABD 5X9   7196D